# Patient Record
Sex: MALE | Race: OTHER | HISPANIC OR LATINO | Employment: PART TIME | URBAN - NONMETROPOLITAN AREA
[De-identification: names, ages, dates, MRNs, and addresses within clinical notes are randomized per-mention and may not be internally consistent; named-entity substitution may affect disease eponyms.]

---

## 2017-06-20 ENCOUNTER — NON-PROVIDER VISIT (OUTPATIENT)
Dept: URGENT CARE | Facility: PHYSICIAN GROUP | Age: 22
End: 2017-06-20

## 2017-06-20 DIAGNOSIS — Z02.1 PRE-EMPLOYMENT DRUG SCREENING: ICD-10-CM

## 2017-06-20 LAB
AMP AMPHETAMINE: NORMAL
COC COCAINE: NORMAL
INT CON NEG: NORMAL
INT CON POS: NORMAL
MET METHAMPHETAMINES: NORMAL
OPI OPIATES: NORMAL
PCP PHENCYCLIDINE: NORMAL
POC DRUG COMMENT 753798-OCCUPATIONAL HEALTH: NORMAL
THC: NORMAL

## 2017-06-20 PROCEDURE — 80305 DRUG TEST PRSMV DIR OPT OBS: CPT | Performed by: PHYSICIAN ASSISTANT

## 2017-07-16 ENCOUNTER — OFFICE VISIT (OUTPATIENT)
Dept: URGENT CARE | Facility: PHYSICIAN GROUP | Age: 22
End: 2017-07-16
Payer: MEDICAID

## 2017-07-16 VITALS
HEART RATE: 77 BPM | WEIGHT: 157 LBS | HEIGHT: 67 IN | TEMPERATURE: 97.5 F | DIASTOLIC BLOOD PRESSURE: 78 MMHG | SYSTOLIC BLOOD PRESSURE: 114 MMHG | OXYGEN SATURATION: 100 % | BODY MASS INDEX: 24.64 KG/M2

## 2017-07-16 DIAGNOSIS — R10.31 RLQ ABDOMINAL PAIN: ICD-10-CM

## 2017-07-16 PROCEDURE — 99214 OFFICE O/P EST MOD 30 MIN: CPT | Performed by: NURSE PRACTITIONER

## 2017-07-16 ASSESSMENT — ENCOUNTER SYMPTOMS
NAUSEA: 1
WEAKNESS: 1
CONSTIPATION: 0
CHILLS: 0
ANOREXIA: 1
FEVER: 1
DIARRHEA: 1
MYALGIAS: 1
VOMITING: 0
SORE THROAT: 0
COUGH: 0
BELCHING: 0
ABDOMINAL PAIN: 1

## 2017-07-16 ASSESSMENT — PATIENT HEALTH QUESTIONNAIRE - PHQ9: CLINICAL INTERPRETATION OF PHQ2 SCORE: 0

## 2017-07-17 NOTE — PROGRESS NOTES
"Subjective:      Logan Mason is a 21 y.o. male who presents with Headache            HPI Comments: Medications, Allergies and Prior Medical Hx reviewed and updated in [facility].with patient/family today         Abdominal Pain  This is a new problem. The current episode started in the past 7 days (3 days). The onset quality is gradual. The problem occurs constantly. The problem has been gradually worsening. The pain is located in the RLQ. The pain is at a severity of 5/10. The quality of the pain is sharp. Associated symptoms include anorexia, diarrhea (4 /day), a fever, myalgias and nausea. Pertinent negatives include no belching, constipation, dysuria, frequency, hematuria or vomiting. The pain is relieved by nothing. He has tried nothing for the symptoms. The treatment provided no relief. There is no history of abdominal surgery, gallstones or GERD.       Review of Systems   Constitutional: Positive for fever and malaise/fatigue. Negative for chills.   HENT: Negative for congestion and sore throat.    Respiratory: Negative for cough.    Cardiovascular: Negative for chest pain.   Gastrointestinal: Positive for nausea, abdominal pain, diarrhea (4 /day) and anorexia. Negative for vomiting and constipation.   Genitourinary: Negative for dysuria, frequency and hematuria.   Musculoskeletal: Positive for myalgias.   Neurological: Positive for weakness.          Objective:     /78 mmHg  Pulse 77  Temp(Src) 36.4 °C (97.5 °F)  Ht 1.702 m (5' 7\")  Wt 71.215 kg (157 lb)  BMI 24.58 kg/m2  SpO2 100%     Physical Exam   Constitutional: He appears well-developed and well-nourished.   HENT:   Head: Normocephalic.   Neck: Normal range of motion. Neck supple.   Cardiovascular: Normal rate, regular rhythm and normal heart sounds.    Pulmonary/Chest: Effort normal and breath sounds normal. No respiratory distress.   Abdominal: Soft. Bowel sounds are normal. There is tenderness in the right lower quadrant. There is tenderness " at McBurney's point. There is no rigidity, no rebound, no guarding, no CVA tenderness and negative Quiroz's sign.   Neurological: He is alert.   Awake, alert, answering questions appropriately, moving all extremeties   Skin: Skin is warm and dry.   Psychiatric: He has a normal mood and affect. His behavior is normal.   Vitals reviewed.              Assessment/Plan:       1. RLQ abdominal pain       I am concerned about possible appendicitis and do not have resources to evaluate,     D/w pt/family recommendation to transfer to ED for evaluation and treatment not available at this facility, they verbalize agreement and request [facility]  D/w Dr Lagos  who accepted patient   Pt will be transported via pvt vehicle

## 2020-12-14 ENCOUNTER — HOSPITAL ENCOUNTER (OUTPATIENT)
Facility: MEDICAL CENTER | Age: 25
End: 2020-12-14
Attending: PHYSICIAN ASSISTANT
Payer: MEDICAID

## 2020-12-14 ENCOUNTER — OFFICE VISIT (OUTPATIENT)
Dept: URGENT CARE | Facility: PHYSICIAN GROUP | Age: 25
End: 2020-12-14
Payer: MEDICAID

## 2020-12-14 VITALS
HEART RATE: 88 BPM | SYSTOLIC BLOOD PRESSURE: 126 MMHG | BODY MASS INDEX: 25.11 KG/M2 | WEIGHT: 160 LBS | TEMPERATURE: 97.6 F | DIASTOLIC BLOOD PRESSURE: 70 MMHG | OXYGEN SATURATION: 97 % | RESPIRATION RATE: 16 BRPM | HEIGHT: 67 IN

## 2020-12-14 DIAGNOSIS — J06.9 UPPER RESPIRATORY TRACT INFECTION, UNSPECIFIED TYPE: ICD-10-CM

## 2020-12-14 PROCEDURE — 99204 OFFICE O/P NEW MOD 45 MIN: CPT | Performed by: PHYSICIAN ASSISTANT

## 2020-12-14 PROCEDURE — U0003 INFECTIOUS AGENT DETECTION BY NUCLEIC ACID (DNA OR RNA); SEVERE ACUTE RESPIRATORY SYNDROME CORONAVIRUS 2 (SARS-COV-2) (CORONAVIRUS DISEASE [COVID-19]), AMPLIFIED PROBE TECHNIQUE, MAKING USE OF HIGH THROUGHPUT TECHNOLOGIES AS DESCRIBED BY CMS-2020-01-R: HCPCS

## 2020-12-14 NOTE — LETTER
December 14, 2020         Patient: Logan Mason   YOB: 1995   Date of Visit: 12/14/2020           To Whom it May Concern:    Logan Mason was seen in my clinic on 12/14/2020.    Please excuse patient for missing school/work until COVID results are available, typically taking 1-3 days.  They have been advised to quarantine during this time.      If you have any questions or concerns, please don't hesitate to call.        Sincerely,           Kari Max P.A.-C.  Electronically Signed

## 2020-12-15 DIAGNOSIS — J06.9 UPPER RESPIRATORY TRACT INFECTION, UNSPECIFIED TYPE: ICD-10-CM

## 2020-12-15 LAB — COVID ORDER STATUS COVID19: NORMAL

## 2020-12-15 NOTE — PROGRESS NOTES
Chief Complaint   Patient presents with   • Coronavirus Screening   • Runny Nose   • Pharyngitis   • Fatigue       HISTORY OF PRESENT ILLNESS: Patient is a 25 y.o. male who presents today for the following:    Cough x 2 days  Denies fever, bodyaches, chills, SOB  + HA, mild nasal congestion  OTC meds today: none  No known COVID exposure    Patient Active Problem List    Diagnosis Date Noted   • Back pain 10/14/2014   • Flu vaccine need 10/14/2014   • Soft tissue mass 10/14/2014       Allergies:Patient has no known allergies.    No current [employer]-ordered outpatient medications on file.     No current [employer]-ordered facility-administered medications on file.        History reviewed. No pertinent past medical history.    Social History     Tobacco Use   • Smoking status: Never Smoker   • Smokeless tobacco: Never Used   Substance Use Topics   • Alcohol use: Yes     Comment: rare ocasions   • Drug use: Yes     Frequency: 7.0 times per week     Types: Marijuana       No family status information on file.     Family History   Problem Relation Age of Onset   • Hypertension Mother        Review of Systems:    Constitutional ROS: No unexpected change in weight, No weakness, No fatigue  Eye ROS: No recent significant change in vision, No eye pain, redness, discharge  Ear ROS: No drainage, No tinnitus or vertigo, No recent change in hearing  Mouth/Throat ROS: No teeth or gum problems, No bleeding gums, No tongue complaints  Neck ROS: No swollen glands, No significant pain in neck  Pulmonary ROS: No chronic cough, sputum, or hemoptysis, No dyspnea on exertion, No wheezing  Cardiovascular ROS: No diaphoresis, No edema, No palpitations  Musculoskeletal/Extremities ROS: No peripheral edema, No pain, redness or swelling on the joints  Hematologic/Lymphatic ROS: No chills, No night sweats, No weight loss  Skin/Integumentary ROS: No edema, No evidence of rash, No itching      Exam:  /70   Pulse 88   Temp 36.4 °C (97.6 °F)   Resp  "16   Ht 1.702 m (5' 7\")   Wt 72.6 kg (160 lb)   SpO2 97%   General: Well developed, well nourished. No distress.    Eye: PERRL/EOMI; conjunctivae clear, lids normal.  ENMT: Lips without lesions, MMM. Oropharynx is clear. Bilateral TMs are within normal limits.  Pulmonary: Unlabored respiratory effort. Lungs clear to auscultation, no wheezes, no rhonchi.    Cardiovascular: Regular rate and rhythm without murmur.   Neurologic: Grossly nonfocal. No facial asymmetry noted.  Lymph: No cervical lymphadenopathy noted.  Skin: Warm, dry, good turgor. No rashes in visible areas.   Psych: Normal mood. Alert and oriented to person, place and time.    Assessment/Plan:  Discussed likely viral etiology.  Vitals and exam are unremarkable.  Low suspicion for pneumonia. Patient will be tested for COVID and has been advised to quarantine until results are available. Encouraged patient [facility]. Sign up information was sent via text message. Discussed appropriate over-the-counter symptomatic medication, and when to return to clinic. Follow up for worsening or persistent symptoms.  1. Upper respiratory tract infection, unspecified type  COVID/SARS COV-2 PCR       "

## 2020-12-16 LAB
SARS-COV-2 RNA RESP QL NAA+PROBE: DETECTED
SPECIMEN SOURCE: ABNORMAL

## 2021-08-12 ENCOUNTER — APPOINTMENT (OUTPATIENT)
Dept: RADIOLOGY | Facility: IMAGING CENTER | Age: 26
End: 2021-08-12
Attending: PHYSICIAN ASSISTANT
Payer: MEDICAID

## 2021-08-12 ENCOUNTER — HOSPITAL ENCOUNTER (OUTPATIENT)
Facility: MEDICAL CENTER | Age: 26
End: 2021-08-12
Attending: PHYSICIAN ASSISTANT
Payer: MEDICAID

## 2021-08-12 ENCOUNTER — OFFICE VISIT (OUTPATIENT)
Dept: URGENT CARE | Facility: CLINIC | Age: 26
End: 2021-08-12
Payer: MEDICAID

## 2021-08-12 VITALS
BODY MASS INDEX: 24.33 KG/M2 | TEMPERATURE: 97.9 F | DIASTOLIC BLOOD PRESSURE: 74 MMHG | WEIGHT: 155 LBS | RESPIRATION RATE: 16 BRPM | HEIGHT: 67 IN | OXYGEN SATURATION: 98 % | HEART RATE: 83 BPM | SYSTOLIC BLOOD PRESSURE: 126 MMHG

## 2021-08-12 DIAGNOSIS — R06.02 SOB (SHORTNESS OF BREATH): ICD-10-CM

## 2021-08-12 PROCEDURE — 71046 X-RAY EXAM CHEST 2 VIEWS: CPT | Mod: TC | Performed by: PHYSICIAN ASSISTANT

## 2021-08-12 PROCEDURE — 99214 OFFICE O/P EST MOD 30 MIN: CPT | Performed by: PHYSICIAN ASSISTANT

## 2021-08-12 PROCEDURE — U0003 INFECTIOUS AGENT DETECTION BY NUCLEIC ACID (DNA OR RNA); SEVERE ACUTE RESPIRATORY SYNDROME CORONAVIRUS 2 (SARS-COV-2) (CORONAVIRUS DISEASE [COVID-19]), AMPLIFIED PROBE TECHNIQUE, MAKING USE OF HIGH THROUGHPUT TECHNOLOGIES AS DESCRIBED BY CMS-2020-01-R: HCPCS

## 2021-08-12 PROCEDURE — U0005 INFEC AGEN DETEC AMPLI PROBE: HCPCS

## 2021-08-12 RX ORDER — ALBUTEROL SULFATE 90 UG/1
2 AEROSOL, METERED RESPIRATORY (INHALATION) EVERY 6 HOURS PRN
Qty: 8.5 G | Refills: 0 | Status: SHIPPED | OUTPATIENT
Start: 2021-08-12 | End: 2021-12-19

## 2021-08-12 ASSESSMENT — ENCOUNTER SYMPTOMS
ABDOMINAL PAIN: 0
VOMITING: 0
DIZZINESS: 0
FEVER: 0
DIARRHEA: 0
SHORTNESS OF BREATH: 1
SPUTUM PRODUCTION: 0
HEADACHES: 0
CHILLS: 0
NAUSEA: 0
WHEEZING: 0
RHINORRHEA: 1
SORE THROAT: 0
MYALGIAS: 0

## 2021-08-13 DIAGNOSIS — R06.02 SOB (SHORTNESS OF BREATH): ICD-10-CM

## 2021-08-13 LAB — COVID ORDER STATUS COVID19: NORMAL

## 2021-08-13 NOTE — PROGRESS NOTES
"Subjective     Logan Mason is a 25 y.o. male who presents with Shortness of Breath (congestion, chest tightness x 3 days )            Shortness of Breath  This is a new problem. The current episode started in the past 7 days. The problem occurs daily. The problem has been unchanged. Associated symptoms include rhinorrhea. Pertinent negatives include no abdominal pain, chest pain, fever, headaches, leg swelling, sore throat, sputum production, vomiting or wheezing. The symptoms are aggravated by smoke. He has tried nothing for the symptoms. The treatment provided no relief. There is no history of asthma or pneumonia.       PMH:  has no past medical history on file.  MEDS: No current outpatient medications on file.  ALLERGIES: No Known Allergies  SURGHX: No past surgical history on file.  SOCHX:  reports that he has never smoked. He has never used smokeless tobacco. He reports current alcohol use. He reports current drug use. Frequency: 7.00 times per week. Drug: Marijuana.  FH: family history includes Hypertension in his mother.    Review of Systems   Constitutional: Negative for chills and fever.   HENT: Positive for rhinorrhea. Negative for congestion and sore throat.    Respiratory: Positive for shortness of breath. Negative for sputum production and wheezing.    Cardiovascular: Negative for chest pain and leg swelling.   Gastrointestinal: Negative for abdominal pain, diarrhea, nausea and vomiting.   Musculoskeletal: Negative for myalgias.   Neurological: Negative for dizziness and headaches.       Medications, Allergies, and current problem list reviewed today in Epic           Objective     /74 (BP Location: Left arm, Patient Position: Sitting, BP Cuff Size: Adult)   Pulse 83   Temp 36.6 °C (97.9 °F) (Temporal)   Resp 16   Ht 1.702 m (5' 7\")   Wt 70.3 kg (155 lb)   SpO2 98%   BMI 24.28 kg/m²      Physical Exam  Vitals and nursing note reviewed.   Constitutional:       General: He is not in acute " distress.     Appearance: Normal appearance. He is well-developed. He is not ill-appearing, toxic-appearing or diaphoretic.   HENT:      Head: Normocephalic and atraumatic.      Right Ear: Tympanic membrane, ear canal and external ear normal.      Left Ear: Tympanic membrane, ear canal and external ear normal.      Nose: Nose normal. No congestion or rhinorrhea.      Mouth/Throat:      Mouth: Mucous membranes are moist.      Pharynx: Oropharynx is clear. No oropharyngeal exudate or posterior oropharyngeal erythema.   Eyes:      General:         Right eye: No discharge.         Left eye: No discharge.      Conjunctiva/sclera: Conjunctivae normal.   Cardiovascular:      Rate and Rhythm: Normal rate and regular rhythm.      Pulses: Normal pulses.      Heart sounds: Normal heart sounds. No murmur heard.     Pulmonary:      Effort: Pulmonary effort is normal. No respiratory distress.      Breath sounds: Normal breath sounds. No wheezing, rhonchi or rales.   Chest:      Chest wall: No tenderness.   Musculoskeletal:         General: No swelling or tenderness.      Cervical back: Normal range of motion and neck supple.      Right lower leg: No edema.      Left lower leg: No edema.   Lymphadenopathy:      Cervical: No cervical adenopathy.   Skin:     General: Skin is warm and dry.   Neurological:      Mental Status: He is alert and oriented to person, place, and time.   Psychiatric:         Mood and Affect: Mood normal.         Behavior: Behavior normal.         Thought Content: Thought content normal.         Judgment: Judgment normal.                   Assessment & Plan          1. SOB (shortness of breath)  DX-CHEST-2 VIEWS    SARS-CoV-2, PCR (In-House): Collect NP OR nasal swab in VTM    albuterol 108 (90 Base) MCG/ACT Aero Soln inhalation aerosol     Shortness of breath with congestion for 3 days.  Tight chest and heavy breathing.  No vomiting, diarrhea, fever, chest pain.  Patient does not feel sick and was not exposed  to Covid.  His PO2 is 90% his vital signs are normal.  His lungs are clear bilaterally without wheezes rhonchi or rales.  Chest x-ray normal.  Covid testing negative.  Likely reactive airway to environmental smoke and air quality.  I believe there is a slight anxiety component as well.  Albuterol.  Follow-up as needed.  OTC meds and conservative measures as discussed    Return to clinic or go to ED if symptoms worsen or persist. Indications for ED discussed at length. Patient/Parent/Guardian voices understanding. Follow-up with your primary care provider in 3-5 days. Red flag symptoms discussed. All side effects of medication discussed including allergic response, GI upset, tendon injury, rash, sedation etc.    Please note that this dictation was created using voice recognition software. I have made every reasonable attempt to correct obvious errors, but I expect that there are errors of grammar and possibly content that I did not discover before finalizing the note.

## 2021-08-14 LAB
SARS-COV-2 RNA RESP QL NAA+PROBE: NOTDETECTED
SPECIMEN SOURCE: NORMAL

## 2021-10-09 ENCOUNTER — OFFICE VISIT (OUTPATIENT)
Dept: URGENT CARE | Facility: CLINIC | Age: 26
End: 2021-10-09
Payer: MEDICAID

## 2021-10-09 ENCOUNTER — HOSPITAL ENCOUNTER (OUTPATIENT)
Facility: MEDICAL CENTER | Age: 26
End: 2021-10-09
Attending: NURSE PRACTITIONER
Payer: MEDICAID

## 2021-10-09 VITALS
SYSTOLIC BLOOD PRESSURE: 126 MMHG | HEART RATE: 98 BPM | OXYGEN SATURATION: 97 % | DIASTOLIC BLOOD PRESSURE: 68 MMHG | HEIGHT: 68 IN | BODY MASS INDEX: 24.4 KG/M2 | TEMPERATURE: 97.9 F | WEIGHT: 161 LBS | RESPIRATION RATE: 20 BRPM

## 2021-10-09 DIAGNOSIS — N48.89 PENILE IRRITATION: ICD-10-CM

## 2021-10-09 PROCEDURE — 99213 OFFICE O/P EST LOW 20 MIN: CPT | Performed by: NURSE PRACTITIONER

## 2021-10-09 PROCEDURE — 87529 HSV DNA AMP PROBE: CPT

## 2021-10-10 NOTE — PROGRESS NOTES
Chief Complaint   Patient presents with   • Other     cut on penis x 1 week       HISTORY OF PRESENT ILLNESS: Patient is a pleasant 25 y.o. male who presents to urgent care today with complaints of irritation to tip of his penis over the past week. He has noticed two lesions. Reports associated dry skin. Denies dysuria, fever, discharge, body aches. He is sexually active with his wife, she is without symptoms. He has tried neosporin with some improvement but has returned.  Denies history of STIs.  He does not have a PCP follow-up.    Patient Active Problem List    Diagnosis Date Noted   • Back pain 10/14/2014   • Flu vaccine need 10/14/2014   • Soft tissue mass 10/14/2014       Allergies:Patient has no known allergies.    Current Outpatient Medications Ordered in Epic   Medication Sig Dispense Refill   • albuterol 108 (90 Base) MCG/ACT Aero Soln inhalation aerosol Inhale 2 Puffs every 6 hours as needed for Shortness of Breath. 8.5 g 0     No current Epic-ordered facility-administered medications on file.       No past medical history on file.    Social History     Tobacco Use   • Smoking status: Never Smoker   • Smokeless tobacco: Never Used   Vaping Use   • Vaping Use: Never used   Substance Use Topics   • Alcohol use: Yes     Comment: rare ocasions   • Drug use: Yes     Frequency: 7.0 times per week     Types: Marijuana       Family Status   Relation Name Status   • Mo  (Not Specified)     Family History   Problem Relation Age of Onset   • Hypertension Mother        ROS:  Review of Systems   Constitutional: Negative for fever, chills, weight loss, malaise, and fatigue.   HENT: Negative for ear pain, nosebleeds, congestion, sore throat and neck pain.    Eyes: Negative for vision changes.   Neuro: Negative for headache, sensory changes, weakness, seizure, LOC.   Cardiovascular: Negative for chest pain, palpitations, orthopnea and leg swelling.   Respiratory: Negative for cough, sputum production, shortness of breath  "and wheezing.   Gastrointestinal: Negative for abdominal pain, nausea, vomiting or diarrhea.   Genitourinary: Negative for dysuria, urgency and frequency.  Musculoskeletal: Negative for falls, neck pain, back pain, joint pain, myalgias.   Skin: Positive for penile lesion.  Negative for rash, diaphoresis.     Exam:  /68 (BP Location: Left arm, Patient Position: Sitting, BP Cuff Size: Adult long)   Pulse 98   Temp 36.6 °C (97.9 °F) (Temporal)   Resp 20   Ht 1.727 m (5' 8\")   Wt 73 kg (161 lb)   SpO2 97%   General: well-nourished, well-developed male in NAD  Head: normocephalic, atraumatic  Eyes: PERRLA, no conjunctival injection, acuity grossly intact, lids normal.  Ears: normal shape and symmetry, no tenderness, no discharge. External canals are without any significant edema or erythema. Tympanic membranes are without any inflammation, no effusion. Gross auditory acuity is intact.  Nose: symmetrical without tenderness, no discharge.  Mouth/Throat: reasonable hygiene, no erythema, exudates or tonsillar enlargement.  Neck: no masses, range of motion within normal limits, no tracheal deviation. No obvious thyroid enlargement.   Lymph: no cervical adenopathy. No supraclavicular adenopathy.   Neuro: alert and oriented. Cranial nerves 1-12 grossly intact. No sensory deficit.   Cardiovascular: regular rate and rhythm. No edema.  Pulmonary: no distress. Chest is symmetrical with respiration, no wheezes, crackles, or rhonchi.   : Uncircumcised penis, at shaft there is a 3 mm fissured lesion, no active drainage, minimal surrounding dry skin.  Testicles normal in appearance.  Musculoskeletal: no clubbing, appropriate muscle tone, gait is stable.  Skin: warm, dry, intact, no clubbing, no cyanosis, no rashes.   Psych: appropriate mood, affect, judgement.         Assessment/Plan:  1. Penile irritation  mupirocin (BACTROBAN) 2 % Ointment    HERPES VIRAL CULTURE           Patient presents with penile irritation and " small lesion noted.  He reports he improved with Neosporin but then worsened again.  Therefore will treat with Bactroban and perform viral culture.  Supportive care, differential diagnoses, and indications for immediate follow-up discussed with patient.   Pathogenesis of diagnosis discussed including typical length and natural progression.   Instructed to return to clinic or nearest emergency department for any change in condition, further concerns, or worsening of symptoms.  Patient states understanding of the plan of care and discharge instructions.  Instructed to make an appointment, for follow up, with a primary care provider.        Please note that this dictation was created using voice recognition software. I have made every reasonable attempt to correct obvious errors, but I expect that there are errors of grammar and possibly content that I did not discover before finalizing the note.      MARIA LUISA Rothman.

## 2021-10-12 LAB
HSV1 DNA SPEC QL NAA+PROBE: NEGATIVE
HSV2 DNA SPEC QL NAA+PROBE: NEGATIVE
SPECIMEN SOURCE: NORMAL

## 2021-11-06 ENCOUNTER — OFFICE VISIT (OUTPATIENT)
Dept: URGENT CARE | Facility: CLINIC | Age: 26
End: 2021-11-06
Payer: MEDICAID

## 2021-11-06 VITALS
DIASTOLIC BLOOD PRESSURE: 88 MMHG | SYSTOLIC BLOOD PRESSURE: 134 MMHG | HEART RATE: 88 BPM | RESPIRATION RATE: 16 BRPM | WEIGHT: 156.2 LBS | TEMPERATURE: 97.9 F | OXYGEN SATURATION: 96 % | HEIGHT: 67 IN | BODY MASS INDEX: 24.52 KG/M2

## 2021-11-06 DIAGNOSIS — L30.9 DERMATITIS: ICD-10-CM

## 2021-11-06 PROCEDURE — 99213 OFFICE O/P EST LOW 20 MIN: CPT | Performed by: NURSE PRACTITIONER

## 2021-11-06 RX ORDER — KETOCONAZOLE 20 MG/G
1 CREAM TOPICAL 2 TIMES DAILY
Qty: 60 G | Refills: 0 | Status: SHIPPED | OUTPATIENT
Start: 2021-11-06 | End: 2021-11-20

## 2021-11-07 NOTE — PROGRESS NOTES
Subjective     Logan Mason is a 25 y.o. male who presents with Tinea (possible ringworm)    No past medical history on file.  Social History     Socioeconomic History   • Marital status:      Spouse name: Not on file   • Number of children: Not on file   • Years of education: Not on file   • Highest education level: Not on file   Occupational History   • Not on file   Tobacco Use   • Smoking status: Never Smoker   • Smokeless tobacco: Never Used   Vaping Use   • Vaping Use: Never used   Substance and Sexual Activity   • Alcohol use: Yes     Comment: rare occassions   • Drug use: Yes     Frequency: 7.0 times per week     Types: Marijuana, Inhaled   • Sexual activity: Not on file   Other Topics Concern   • Not on file   Social History Narrative   • Not on file     Social Determinants of Health     Financial Resource Strain:    • Difficulty of Paying Living Expenses:    Food Insecurity:    • Worried About Running Out of Food in the Last Year:    • Ran Out of Food in the Last Year:    Transportation Needs:    • Lack of Transportation (Medical):    • Lack of Transportation (Non-Medical):    Physical Activity:    • Days of Exercise per Week:    • Minutes of Exercise per Session:    Stress:    • Feeling of Stress :    Social Connections:    • Frequency of Communication with Friends and Family:    • Frequency of Social Gatherings with Friends and Family:    • Attends Jainism Services:    • Active Member of Clubs or Organizations:    • Attends Club or Organization Meetings:    • Marital Status:    Intimate Partner Violence:    • Fear of Current or Ex-Partner:    • Emotionally Abused:    • Physically Abused:    • Sexually Abused:      Family History   Problem Relation Age of Onset   • Hypertension Mother        Allergies: Patient has no known allergies.    Patient is a 25-year-old male who presents today with complaint of possible tinea infection.  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  Patient states he is noted a few areas on his arm and trunk over the last week or so.  He states no fever, aches, or chills.  No severe itching.          Other  This is a new problem. The current episode started in the past 7 days. The problem occurs constantly. The problem has been unchanged. Associated symptoms include a rash. Nothing aggravates the symptoms. He has tried nothing for the symptoms. The treatment provided no relief.       Review of Systems   Skin: Positive for rash.   All other systems reviewed and are negative.             Objective     /88   Pulse 88   Temp 36.6 °C (97.9 °F)   Resp 16   Ht 1.702 m (5' 7\")   Wt 70.9 kg (156 lb 3.2 oz)   SpO2 96%   BMI 24.46 kg/m²      Physical Exam  Vitals reviewed.   Constitutional:       Appearance: Normal appearance.   Musculoskeletal:         General: Normal range of motion.   Skin:     General: Skin is warm and dry.      Findings: Rash present.   Neurological:      General: No focal deficit present.      Mental Status: He is alert and oriented to person, place, and time.   Psychiatric:         Mood and Affect: Mood normal.         Behavior: Behavior normal.       There is an annular lesion noted to the left wrist, left cheek, and upper trunk.                      Assessment & Plan   Dermatitis; possible tinea infection    Ketoconazole topical  Return to office for persistent or worsening of symptoms, may return otherwise for any further questions or concerns     There are no diagnoses linked to this encounter.              "

## 2021-12-19 ENCOUNTER — OFFICE VISIT (OUTPATIENT)
Dept: URGENT CARE | Facility: CLINIC | Age: 26
End: 2021-12-19
Payer: MEDICAID

## 2021-12-19 VITALS
BODY MASS INDEX: 24.74 KG/M2 | OXYGEN SATURATION: 95 % | WEIGHT: 157.6 LBS | RESPIRATION RATE: 16 BRPM | DIASTOLIC BLOOD PRESSURE: 90 MMHG | TEMPERATURE: 98 F | HEIGHT: 67 IN | SYSTOLIC BLOOD PRESSURE: 140 MMHG | HEART RATE: 102 BPM

## 2021-12-19 DIAGNOSIS — N48.89 PENILE IRRITATION: ICD-10-CM

## 2021-12-19 PROCEDURE — 99213 OFFICE O/P EST LOW 20 MIN: CPT | Performed by: PHYSICIAN ASSISTANT

## 2021-12-20 NOTE — PROGRESS NOTES
"Subjective:   Logan Mason is a 26 y.o. male who presents for Medication Refill (Request refill on ointment for dry skin on penis area. )      HPI  26 y.o. male presents to urgent care with new problem to provider of irritation with and dry skin to the tip of penis noted over the last week or so.  Patient denies dysuria, discharge, fevers, body aches, or abdominal pain.  He is sexually active in a monogamous relationship with his wife.  He denies exposure concern for STDs.  Patient reports history of similar success treated with antibiotic ointment. Denies other associated aggravating or alleviating factors.     Review of Systems   Constitutional: Negative for chills and fever.   Gastrointestinal: Negative for abdominal pain, diarrhea, nausea and vomiting.   Genitourinary: Negative for dysuria, flank pain, frequency, hematuria and urgency.        Penile discharge   All other systems reviewed and are negative.      Patient Active Problem List   Diagnosis   • Back pain   • Flu vaccine need   • Soft tissue mass     History reviewed. No pertinent surgical history.  Social History     Tobacco Use   • Smoking status: Never Smoker   • Smokeless tobacco: Never Used   Vaping Use   • Vaping Use: Never used   Substance Use Topics   • Alcohol use: Yes     Comment: rare occassions   • Drug use: Yes     Frequency: 7.0 times per week     Types: Marijuana, Inhaled      Family History   Problem Relation Age of Onset   • Hypertension Mother       (Allergies, Medications, & Tobacco/Substance Use were reconciled by the Medical Assistant and reviewed by myself. The family history is prepopulated)     Objective:     /90   Pulse (!) 102   Temp 36.7 °C (98 °F)   Resp 16   Ht 1.702 m (5' 7\")   Wt 71.5 kg (157 lb 9.6 oz)   SpO2 95%   BMI 24.68 kg/m²     Physical Exam  Vitals reviewed.   Constitutional:       General: He is not in acute distress.     Appearance: Normal appearance. He is not ill-appearing.   HENT:      Head: " Normocephalic and atraumatic.   Cardiovascular:      Rate and Rhythm: Normal rate.   Pulmonary:      Effort: Pulmonary effort is normal. No respiratory distress.   Genitourinary:     Comments: Patient defers  exam  Musculoskeletal:         General: Normal range of motion.      Cervical back: Normal range of motion.   Skin:     General: Skin is warm and dry.   Neurological:      Mental Status: He is alert and oriented to person, place, and time.   Psychiatric:         Mood and Affect: Mood normal.         Behavior: Behavior normal.         Thought Content: Thought content normal.         Judgment: Judgment normal.         Assessment/Plan:     1. Penile irritation  mupirocin (BACTROBAN) 2 % Ointment     Patient reports history of similar penile irritation treated successfully with Bactroban.  Differential diagnosis includes balanitis, patient is uncircumcised.  Recommend previous prescribed antifungal ketoconazole if symptoms persist/worsen after finishing topical antibiotic course for 1 week.    Differential diagnosis, natural history, supportive care, and indications for immediate follow-up discussed.    Advised the patient to follow-up with the primary care physician for recheck, reevaluation, and consideration of further management.  Patient verbalized understanding of treatment plan and has no further questions regarding care.     I personally reviewed prior external notes and test results pertinent to today's visit.    Please note that this dictation was created using voice recognition software. I have made a reasonable attempt to correct obvious errors, but I expect that there are errors of grammar and possibly content that I did not discover before finalizing the note.    This note was electronically signed by Zeinab Lucia PA-C

## 2021-12-23 ASSESSMENT — ENCOUNTER SYMPTOMS
CHILLS: 0
ABDOMINAL PAIN: 0
NAUSEA: 0
FEVER: 0
FLANK PAIN: 0
DIARRHEA: 0
VOMITING: 0

## 2021-12-27 ENCOUNTER — HOSPITAL ENCOUNTER (OUTPATIENT)
Facility: MEDICAL CENTER | Age: 26
End: 2021-12-27
Attending: PHYSICIAN ASSISTANT
Payer: MEDICAID

## 2021-12-27 ENCOUNTER — OFFICE VISIT (OUTPATIENT)
Dept: URGENT CARE | Facility: CLINIC | Age: 26
End: 2021-12-27
Payer: MEDICAID

## 2021-12-27 VITALS
TEMPERATURE: 97.6 F | DIASTOLIC BLOOD PRESSURE: 70 MMHG | HEIGHT: 67 IN | BODY MASS INDEX: 24.8 KG/M2 | SYSTOLIC BLOOD PRESSURE: 120 MMHG | RESPIRATION RATE: 16 BRPM | WEIGHT: 158 LBS | OXYGEN SATURATION: 98 % | HEART RATE: 104 BPM

## 2021-12-27 DIAGNOSIS — J02.9 PHARYNGITIS, UNSPECIFIED ETIOLOGY: ICD-10-CM

## 2021-12-27 DIAGNOSIS — B34.9 NONSPECIFIC SYNDROME SUGGESTIVE OF VIRAL ILLNESS: ICD-10-CM

## 2021-12-27 DIAGNOSIS — H10.32 ACUTE BACTERIAL CONJUNCTIVITIS OF LEFT EYE: ICD-10-CM

## 2021-12-27 LAB
INT CON NEG: NEGATIVE
INT CON POS: POSITIVE
S PYO AG THROAT QL: NEGATIVE

## 2021-12-27 PROCEDURE — U0003 INFECTIOUS AGENT DETECTION BY NUCLEIC ACID (DNA OR RNA); SEVERE ACUTE RESPIRATORY SYNDROME CORONAVIRUS 2 (SARS-COV-2) (CORONAVIRUS DISEASE [COVID-19]), AMPLIFIED PROBE TECHNIQUE, MAKING USE OF HIGH THROUGHPUT TECHNOLOGIES AS DESCRIBED BY CMS-2020-01-R: HCPCS

## 2021-12-27 PROCEDURE — U0005 INFEC AGEN DETEC AMPLI PROBE: HCPCS

## 2021-12-27 PROCEDURE — 87880 STREP A ASSAY W/OPTIC: CPT | Mod: QW | Performed by: PHYSICIAN ASSISTANT

## 2021-12-27 PROCEDURE — 99213 OFFICE O/P EST LOW 20 MIN: CPT | Performed by: PHYSICIAN ASSISTANT

## 2021-12-27 RX ORDER — POLYMYXIN B SULFATE AND TRIMETHOPRIM 1; 10000 MG/ML; [USP'U]/ML
1 SOLUTION OPHTHALMIC EVERY 4 HOURS
Qty: 4 ML | Refills: 0 | Status: SHIPPED | OUTPATIENT
Start: 2021-12-27 | End: 2022-01-06

## 2021-12-27 ASSESSMENT — ENCOUNTER SYMPTOMS
COUGH: 1
EYE REDNESS: 1
DOUBLE VISION: 0
EYE DISCHARGE: 0
SORE THROAT: 1
PHOTOPHOBIA: 0
BLURRED VISION: 0

## 2021-12-27 NOTE — LETTER
December 27, 2021    To Whom It May Concern:         This is confirmation that Logan Mason attended his scheduled appointment with Adriano Hlul P.A.-C. on 12/27/21.  Your employee was seen in our clinic today.  A concern for COVID-19 has been identified and testing is in progress. We are asking you to excuse absences while following self-isolation protocol per Center for Disease Control (CDC) guidelines.  Your employee will be able to access test results through our electronic delivery system called [employer].     If the results of testing are positive, your employee should follow the CDC guidelines.  These are isolation for a minimum of 10 days and at least 24 hours have passed since your last fever without the use of fever-reducing medications and all other symptoms have improved.  The health department may contact you and provide further directions regarding self-isolation and return to work.     If the results of testing are negative, and once there has been no fever (tem >100.4) for at least 48 hours without treatment, and no vomiting or diarrhea for at least 48 hours, then return to work is approved.    This is the only note that will be provided from [facility] for this visit.  Your employee will require an appointment with a primary care provider if FMLA or disability forms are required.  If you have any questions please do not hesitate to call me at the phone number listed below.    Sincerely,    Adriano Hull P.A.-C.  [phone]  (signed electronically)

## 2021-12-28 DIAGNOSIS — H10.32 ACUTE BACTERIAL CONJUNCTIVITIS OF LEFT EYE: ICD-10-CM

## 2021-12-28 DIAGNOSIS — J02.9 PHARYNGITIS, UNSPECIFIED ETIOLOGY: ICD-10-CM

## 2021-12-28 DIAGNOSIS — B34.9 NONSPECIFIC SYNDROME SUGGESTIVE OF VIRAL ILLNESS: ICD-10-CM

## 2021-12-28 LAB — COVID ORDER STATUS COVID19: NORMAL

## 2021-12-28 NOTE — PROGRESS NOTES
"Subjective:   Logan Mason is a 26 y.o. male who presents for Pharyngitis (sore throat, congestion, possible strep, Wife came in yesterday and tested positive.) and Conjunctivitis (Possible pink eye, noticed the redness, but said it doesn't itch and no discharge.)      Pleasant 26-year-old male presents complaining of nasal congestion, mild sore throat, occasional dry cough as well as left eye irritation without discharge.  He notes that his wife was seen yesterday and has a pending Covid test however point-of-care test for strep was positive.  He denies any difficulty swallowing.  He notes that without any over-the-counter medication his throat is feeling better today.  He denies any difficulty breathing.      Review of Systems   HENT: Positive for congestion and sore throat.    Eyes: Positive for redness. Negative for blurred vision, double vision, photophobia and discharge.   Respiratory: Positive for cough.        Medications, Allergies, and current problem list reviewed today in Epic.     Objective:     /70 (BP Location: Left arm, Patient Position: Sitting, BP Cuff Size: Adult)   Pulse (!) 104   Temp 36.4 °C (97.6 °F) (Temporal)   Resp 16   Ht 1.702 m (5' 7\")   Wt 71.7 kg (158 lb)   SpO2 98%     Physical Exam  Vitals reviewed.   Constitutional:       Appearance: Normal appearance.   HENT:      Head: Normocephalic and atraumatic.      Right Ear: External ear normal.      Left Ear: External ear normal.      Nose: Rhinorrhea present. No congestion.      Mouth/Throat:      Mouth: Mucous membranes are moist.      Pharynx: Oropharynx is clear. No oropharyngeal exudate or posterior oropharyngeal erythema.   Eyes:      Conjunctiva/sclera: Conjunctivae normal.      Comments: Mild left eye conjunctival injection without erythema surrounding or discharge.  PERRLA, EOMI.   Cardiovascular:      Rate and Rhythm: Normal rate and regular rhythm.   Pulmonary:      Effort: Pulmonary effort is normal.      Breath " sounds: Normal breath sounds.   Lymphadenopathy:      Cervical: No cervical adenopathy.   Skin:     General: Skin is warm and dry.      Capillary Refill: Capillary refill takes less than 2 seconds.   Neurological:      Mental Status: He is alert and oriented to person, place, and time.         Assessment/Plan:     Diagnosis and associated orders:     1. Acute bacterial conjunctivitis of left eye  COVID/SARS CoV-2 PCR    polymixin-trimethoprim (POLYTRIM) 68449-7.1 UNIT/ML-% Solution   2. Pharyngitis, unspecified etiology  COVID/SARS CoV-2 PCR    POCT Rapid Strep A   3. Nonspecific syndrome suggestive of viral illness  COVID/SARS CoV-2 PCR      Comments/MDM:     • Patient does not wear contact lenses and his vision is not affected, recommend wait-and-see and if notes discharge or worsening redness and irritation of the left arm initiate Polytrim drops.  Point-of-care Covid testing is negative.  Recommend Covid testing, isolate till results available via [employer].  Then follow CDC guidelines thereafter.         Differential diagnosis, natural history, supportive care, and indications for immediate follow-up discussed.    Advised the patient to follow-up with the primary care physician for recheck, reevaluation, and consideration of further management.    Please note that this dictation was created using voice recognition software. I have made a reasonable attempt to correct obvious errors, but I expect that there are errors of grammar and possibly content that I did not discover before finalizing the note.    This note was electronically signed by Adriano Hull PA-C

## 2021-12-29 LAB
SARS-COV-2 RNA RESP QL NAA+PROBE: NOTDETECTED
SPECIMEN SOURCE: NORMAL

## 2022-01-12 ENCOUNTER — TELEPHONE (OUTPATIENT)
Dept: SCHEDULING | Facility: IMAGING CENTER | Age: 27
End: 2022-01-12

## 2022-01-21 ENCOUNTER — OFFICE VISIT (OUTPATIENT)
Dept: MEDICAL GROUP | Facility: MEDICAL CENTER | Age: 27
End: 2022-01-21
Payer: COMMERCIAL

## 2022-01-21 VITALS
BODY MASS INDEX: 24.55 KG/M2 | HEIGHT: 67 IN | DIASTOLIC BLOOD PRESSURE: 78 MMHG | WEIGHT: 156.4 LBS | TEMPERATURE: 97.6 F | OXYGEN SATURATION: 94 % | SYSTOLIC BLOOD PRESSURE: 114 MMHG | HEART RATE: 82 BPM

## 2022-01-21 DIAGNOSIS — B35.6 TINEA CRURIS: ICD-10-CM

## 2022-01-21 DIAGNOSIS — Z00.00 WELLNESS EXAMINATION: ICD-10-CM

## 2022-01-21 PROCEDURE — 99214 OFFICE O/P EST MOD 30 MIN: CPT | Performed by: STUDENT IN AN ORGANIZED HEALTH CARE EDUCATION/TRAINING PROGRAM

## 2022-01-21 RX ORDER — KETOCONAZOLE 20 MG/ML
SHAMPOO TOPICAL
Qty: 100 ML | Refills: 2 | Status: SHIPPED | OUTPATIENT
Start: 2022-01-21 | End: 2022-03-08 | Stop reason: SDUPTHER

## 2022-01-21 ASSESSMENT — PATIENT HEALTH QUESTIONNAIRE - PHQ9: CLINICAL INTERPRETATION OF PHQ2 SCORE: 0

## 2022-01-21 NOTE — PROGRESS NOTES
"Subjective:     CC:  Diagnoses of Tinea cruris and Wellness examination were pertinent to this visit.    HISTORY OF THE PRESENT ILLNESS: Logan Mason is a 26-year-old man here to establish care.    Problem   Tinea Cruris    For past few months she has had recurrent rash on his groin and buttocks area.  Was treated with mupirocin after being seen in urgent care however did not improve with this, then was told it was a fungal infection.  He used an over-the-counter antifungal and it improved and is currently not having the issue however he is concerned about another recurrence.  Of note he has also been having issues with ringworm in his household, and his wife is using clotrimazole cream, and they have begun to start treating their pets.         Current Outpatient Medications Ordered in Epic   Medication Sig Dispense Refill   • ketoconazole (NIZORAL) 2 % shampoo Apply to affected area of damp skin, lather, leave on 5 minutes, and rinse 100 mL 2   • mupirocin (BACTROBAN) 2 % Ointment Apply 1 Application topically 2 times a day. (Patient not taking: Reported on 1/21/2022) 22 g 0     No current Epic-ordered facility-administered medications on file.       Health Maintenance: Completed    ROS:   Gen: no fevers/chills, no changes in weight  Eyes: no changes in vision  ENT: no sore throat, no hearing loss, no bloody nose  Pulm: no sob, no cough  CV: no chest pain, no palpitations  GI: no nausea/vomiting, no diarrhea  : no dysuria  MSk: no myalgias  Skin: no rash  Neuro: no headaches, no numbness/tingling  Heme/Lymph: no easy bruising      Objective:       Exam: /78   Pulse 82   Temp 36.4 °C (97.6 °F) (Temporal)   Ht 1.702 m (5' 7\")   Wt 70.9 kg (156 lb 6.4 oz)   SpO2 94%  Body mass index is 24.5 kg/m².    General: Normal appearing. No distress.  HEENT: Normocephalic. Eyes conjunctiva clear lids without ptosis, pupils equal and reactive to light accommodation, ears normal shape and contour  Neck: Supple " without JVD or bruit. Thyroid is not enlarged.  Pulmonary: Clear to ausculation.  Normal effort. No rales, ronchi, or wheezing.  Cardiovascular: Regular rate and rhythm without murmur. Carotid and radial pulses are intact and equal bilaterally.  Neurologic: Grossly nonfocal  Lymph: No cervical or supraclavicular lymph nodes are palpable  Skin: Warm and dry.  No obvious lesions.  Musculoskeletal: Normal gait. No extremity cyanosis, clubbing, or edema.  Psych: Normal mood and affect. Alert and oriented x3. Judgment and insight is normal.        Assessment & Plan:   26 y.o. male with the following -    Problem List Items Addressed This Visit     Tinea cruris     Ketoconazole shampoo as needed for recurrence  Advised patient that all members of household should have full treatment for ringworm to prevent recurrence of rashes.         Relevant Medications    ketoconazole (NIZORAL) 2 % shampoo    Other Relevant Orders    Comp Metabolic Panel      Other Visit Diagnoses     Wellness examination        Relevant Orders    Comp Metabolic Panel        Return if symptoms worsen or fail to improve.    Please note that this dictation was created using voice recognition software. I have made every reasonable attempt to correct obvious errors, but I expect that there are errors of grammar and possibly content that I did not discover before finalizing the note.

## 2022-02-22 ENCOUNTER — HOSPITAL ENCOUNTER (OUTPATIENT)
Facility: MEDICAL CENTER | Age: 27
End: 2022-02-22
Attending: STUDENT IN AN ORGANIZED HEALTH CARE EDUCATION/TRAINING PROGRAM
Payer: COMMERCIAL

## 2022-02-22 ENCOUNTER — OFFICE VISIT (OUTPATIENT)
Dept: MEDICAL GROUP | Facility: MEDICAL CENTER | Age: 27
End: 2022-02-22
Payer: COMMERCIAL

## 2022-02-22 VITALS
DIASTOLIC BLOOD PRESSURE: 82 MMHG | WEIGHT: 151.8 LBS | TEMPERATURE: 97.8 F | OXYGEN SATURATION: 98 % | BODY MASS INDEX: 23.83 KG/M2 | HEART RATE: 71 BPM | SYSTOLIC BLOOD PRESSURE: 118 MMHG | HEIGHT: 67 IN

## 2022-02-22 DIAGNOSIS — R05.9 COUGH: ICD-10-CM

## 2022-02-22 PROCEDURE — U0005 INFEC AGEN DETEC AMPLI PROBE: HCPCS

## 2022-02-22 PROCEDURE — U0003 INFECTIOUS AGENT DETECTION BY NUCLEIC ACID (DNA OR RNA); SEVERE ACUTE RESPIRATORY SYNDROME CORONAVIRUS 2 (SARS-COV-2) (CORONAVIRUS DISEASE [COVID-19]), AMPLIFIED PROBE TECHNIQUE, MAKING USE OF HIGH THROUGHPUT TECHNOLOGIES AS DESCRIBED BY CMS-2020-01-R: HCPCS

## 2022-02-22 PROCEDURE — 99213 OFFICE O/P EST LOW 20 MIN: CPT | Performed by: STUDENT IN AN ORGANIZED HEALTH CARE EDUCATION/TRAINING PROGRAM

## 2022-02-22 RX ORDER — BENZONATATE 100 MG/1
100 CAPSULE ORAL 3 TIMES DAILY PRN
Qty: 60 CAPSULE | Refills: 0 | Status: SHIPPED | OUTPATIENT
Start: 2022-02-22 | End: 2022-03-04

## 2022-02-22 NOTE — LETTER
[facility] [address] [facility] [address]     February 22, 2022    Patient: Logan Mason   YOB: 1995   Date of Visit: 2/22/2022       To Whom It May Concern:    Logan Mason was seen and treated in our department on 2/22/2022. Please excuse any recent absences due to illness.     Sincerely,       Pablo Hector M.D.

## 2022-02-23 DIAGNOSIS — R05.9 COUGH: ICD-10-CM

## 2022-02-23 LAB
AMBIGUOUS DTTM AMBI4: NORMAL
COVID ORDER STATUS COVID19: NORMAL
SARS-COV-2 RNA RESP QL NAA+PROBE: NOTDETECTED
SPECIMEN SOURCE: NORMAL

## 2022-02-23 NOTE — PATIENT INSTRUCTIONS
Try cetirizine for congestion  Continue taking guaifenisin  Try tessalon perles    Saline Rinse Recipe  Ingredients  1.  Pickling or fran salt-containing no iodide, anti-caking agents or preservatives (these can be irritating to the nasal lining)  2.  Baking soda  3.  8 ounces (1 cup) of lukewarm distilled or boiled water    In a clean container, mix 3 teaspoons of iodide-free salt with 1 teaspoon of baking soda and store in a small airtight container. Add 1 teaspoon of the mixture to 8 ounces (1 cup) of lukewarm distilled or boiled water.    Use less dry ingredients to make a weaker solution if burning or stinging is experienced. For children, use a half-teaspoon with 4 ounces of water.    Using a soft rubber ear bulb syringe, infant nasal bulb or a commercial nasal saline rinse product from your drug store, use the rinse by following these steps:    1.  Draw up saline into the bulb. Tilt your head downward over a sink (or in the shower) and rotate to the left. Squeeze approximately 4 ounces of solution gently into the right (top) nostril. Breathe normally through your mouth. In a few seconds the solution should come out through your left nostril. Rotate your head and repeat the process on the left side.    2.  Adjust your head position as needed so the solution does not go down the back of your throat or into your ears.    3.  Blow your nose very gently to prevent the solution from going into your ear and causing discomfort.    4.  After using the rinse, you may continue using your prescribed nasal medications as normal. You may notice that they work better.    Do not use sinus rinses if your nasal passageway is severely blocked. As with any medical product, be sure to speak to your doctor about using sinus rinses and stop using if you experience pain, nosebleeds or other problems.

## 2022-02-23 NOTE — PROGRESS NOTES
"Subjective:     CC: Cough    HPI:   Logan presents today with complaint of 1 week of cough and congestion.  He reports symptoms started after receiving his second shot of the Covid vaccine (Moderna).  Initially after the shot he had some generalized body aches and chills, no measured fever.  Since then has had persistent cough that is becoming increasingly bothersome.  No shortness of breath or chest pain.  He does not smoke tobacco but frequently uses cannabis, he mostly uses a vaporizer    Current Outpatient Medications Ordered in Epic   Medication Sig Dispense Refill   • benzonatate (TESSALON) 100 MG Cap Take 1 Capsule by mouth 3 times a day as needed for Cough for up to 10 days. 60 Capsule 0   • ketoconazole (NIZORAL) 2 % shampoo Apply to affected area of damp skin, lather, leave on 5 minutes, and rinse (Patient not taking: Reported on 2/22/2022) 100 mL 2   • mupirocin (BACTROBAN) 2 % Ointment Apply 1 Application topically 2 times a day. (Patient not taking: No sig reported) 22 g 0     No current Epic-ordered facility-administered medications on file.       ROS:  Gen: no fevers/chills, no changes in weight  Eyes: no changes in vision  ENT: no sore throat, no hearing loss, no bloody nose  Pulm: no sob, no cough  CV: no chest pain, no palpitations  GI: no nausea/vomiting, no diarrhea  : no dysuria  MSk: no myalgias  Skin: no rash  Neuro: no headaches, no numbness/tingling  Heme/Lymph: no easy bruising      Objective:     Exam:  /82   Pulse 71   Temp 36.6 °C (97.8 °F) (Temporal)   Ht 1.702 m (5' 7\")   Wt 68.9 kg (151 lb 12.8 oz)   SpO2 98%   BMI 23.78 kg/m²  Body mass index is 23.78 kg/m².    Gen: Alert and oriented, No apparent distress.  Neck: Neck is supple without lymphadenopathy.  Lungs: Frequent coarse cough normal breathing effort, mild expiratory wheeze  CV: Regular rate and rhythm. No murmurs, rubs, or gallops.  Ext: No clubbing, cyanosis, edema.      Assessment & Plan:     26 y.o. male with " the following -     1. Cough  We will check for Covid, will also get chest x-ray and blood work  Symptomatic treatment, sent Tessalon Perles, can continue guaifenesin, Tylenol as needed.  Follow-up if no improvement or worsens  - SARS-CoV-2 PCR (24 hour In-House): Collect NP swab in VTM; Future  - DX-CHEST-2 VIEWS; Future  - benzonatate (TESSALON) 100 MG Cap; Take 1 Capsule by mouth 3 times a day as needed for Cough for up to 10 days.  Dispense: 60 Capsule; Refill: 0  - PROCALCITONIN; Future  - CBC WITH DIFFERENTIAL; Future     Please note that this dictation was created using voice recognition software. I have made every reasonable attempt to correct obvious errors, but I expect that there are errors of grammar and possibly content that I did not discover before finalizing the note.

## 2022-03-08 ENCOUNTER — OFFICE VISIT (OUTPATIENT)
Dept: MEDICAL GROUP | Facility: MEDICAL CENTER | Age: 27
End: 2022-03-08
Payer: COMMERCIAL

## 2022-03-08 VITALS
HEART RATE: 82 BPM | WEIGHT: 152 LBS | SYSTOLIC BLOOD PRESSURE: 104 MMHG | OXYGEN SATURATION: 97 % | DIASTOLIC BLOOD PRESSURE: 62 MMHG | BODY MASS INDEX: 23.86 KG/M2 | TEMPERATURE: 98.3 F | HEIGHT: 67 IN

## 2022-03-08 DIAGNOSIS — N47.1 PHIMOSIS: ICD-10-CM

## 2022-03-08 DIAGNOSIS — B35.6 TINEA CRURIS: ICD-10-CM

## 2022-03-08 DIAGNOSIS — N48.1 BALANITIS: ICD-10-CM

## 2022-03-08 PROCEDURE — 99213 OFFICE O/P EST LOW 20 MIN: CPT | Performed by: STUDENT IN AN ORGANIZED HEALTH CARE EDUCATION/TRAINING PROGRAM

## 2022-03-08 RX ORDER — KETOCONAZOLE 20 MG/ML
SHAMPOO TOPICAL
Qty: 100 ML | Refills: 2 | Status: SHIPPED | OUTPATIENT
Start: 2022-03-08 | End: 2022-07-26

## 2022-03-08 RX ORDER — FLUCONAZOLE 150 MG/1
150 TABLET ORAL
Qty: 2 TABLET | Refills: 0 | Status: SHIPPED | OUTPATIENT
Start: 2022-03-08 | End: 2022-07-26

## 2022-03-09 NOTE — PROGRESS NOTES
"Subjective:     CC: Irritation of penis    HPI:   Logan presents today with complaint of continued irritation of his penis.  His tinea cruris has improved with the ketoconazole shampoo, and the irritation of his foreskin and tip of his penis did improve with this shampoo as well and had resolved for a few weeks however it has returned, and is now getting to the point where intermittently he is having phimosis due to the irritation and unable to retract his foreskin.  He would like to see a urologist for this problem.  Is also low on the ketoconazole shampoo.        Current Outpatient Medications Ordered in Epic   Medication Sig Dispense Refill   • fluconazole (DIFLUCAN) 150 MG tablet Take 1 Tablet by mouth every 7 days. 2 Tablet 0   • ketoconazole (NIZORAL) 2 % shampoo Apply to affected area of damp skin, lather, leave on 5 minutes, and rinse 100 mL 2   • mupirocin (BACTROBAN) 2 % Ointment Apply 1 Application topically 2 times a day. 22 g 0     No current Epic-ordered facility-administered medications on file.       ROS:  Gen: no fevers/chills, no changes in weight  Eyes: no changes in vision  ENT: no sore throat, no hearing loss, no bloody nose  Pulm: no sob, no cough  CV: no chest pain, no palpitations  GI: no nausea/vomiting, no diarrhea  : no dysuria  MSk: no myalgias  Skin: no rash  Neuro: no headaches, no numbness/tingling  Heme/Lymph: no easy bruising      Objective:     Exam:  /62 (BP Location: Left arm, Patient Position: Sitting, BP Cuff Size: Adult)   Pulse 82   Temp 36.8 °C (98.3 °F) (Temporal)   Ht 1.702 m (5' 7\")   Wt 68.9 kg (152 lb)   SpO2 97%   BMI 23.81 kg/m²  Body mass index is 23.81 kg/m².    Gen: Alert and oriented, No apparent distress.  Neck: Neck is supple without lymphadenopathy.  Lungs: Normal effort, CTA bilaterally, no wheezes, rhonchi, or rales  CV: Regular rate and rhythm. No murmurs, rubs, or gallops.  Ext: No clubbing, cyanosis, edema.      Assessment & Plan:     26 y.o. " male with the following -     1. Balanitis  2. Phimosis  3. Tinea cruris  We will treat with oral fluconazole for 2 weeks, continue the ketoconazole shampoo.  Given his recurrent phimosis secondary to balanitis, will do urology referral.  Encouraged good hygiene, daily showers and regular cleaning of foreskin and glans  - fluconazole (DIFLUCAN) 150 MG tablet; Take 1 Tablet by mouth every 7 days.  Dispense: 2 Tablet; Refill: 0  - Referral to Urology  - ketoconazole (NIZORAL) 2 % shampoo; Apply to affected area of damp skin, lather, leave on 5 minutes, and rinse  Dispense: 100 mL; Refill: 2         Please note that this dictation was created using voice recognition software. I have made every reasonable attempt to correct obvious errors, but I expect that there are errors of grammar and possibly content that I did not discover before finalizing the note.

## 2022-07-26 ENCOUNTER — OFFICE VISIT (OUTPATIENT)
Dept: URGENT CARE | Facility: CLINIC | Age: 27
End: 2022-07-26
Payer: MEDICAID

## 2022-07-26 ENCOUNTER — HOSPITAL ENCOUNTER (OUTPATIENT)
Facility: MEDICAL CENTER | Age: 27
End: 2022-07-26
Attending: PHYSICIAN ASSISTANT
Payer: MEDICAID

## 2022-07-26 VITALS
RESPIRATION RATE: 14 BRPM | WEIGHT: 147 LBS | BODY MASS INDEX: 23.07 KG/M2 | HEIGHT: 67 IN | OXYGEN SATURATION: 97 % | SYSTOLIC BLOOD PRESSURE: 128 MMHG | HEART RATE: 71 BPM | DIASTOLIC BLOOD PRESSURE: 76 MMHG | TEMPERATURE: 98.6 F

## 2022-07-26 DIAGNOSIS — R30.0 DYSURIA: ICD-10-CM

## 2022-07-26 LAB
APPEARANCE UR: CLEAR
BILIRUB UR STRIP-MCNC: NEGATIVE MG/DL
COLOR UR AUTO: YELLOW
GLUCOSE UR STRIP.AUTO-MCNC: NEGATIVE MG/DL
KETONES UR STRIP.AUTO-MCNC: NEGATIVE MG/DL
LEUKOCYTE ESTERASE UR QL STRIP.AUTO: NEGATIVE
NITRITE UR QL STRIP.AUTO: NEGATIVE
PH UR STRIP.AUTO: 6.5 [PH] (ref 5–8)
PROT UR QL STRIP: NEGATIVE MG/DL
RBC UR QL AUTO: NEGATIVE
SP GR UR STRIP.AUTO: <=1.005
UROBILINOGEN UR STRIP-MCNC: 0.2 MG/DL

## 2022-07-26 PROCEDURE — 87491 CHLMYD TRACH DNA AMP PROBE: CPT

## 2022-07-26 PROCEDURE — 87591 N.GONORRHOEAE DNA AMP PROB: CPT

## 2022-07-26 PROCEDURE — 87086 URINE CULTURE/COLONY COUNT: CPT

## 2022-07-26 PROCEDURE — 99213 OFFICE O/P EST LOW 20 MIN: CPT | Performed by: PHYSICIAN ASSISTANT

## 2022-07-26 PROCEDURE — 81002 URINALYSIS NONAUTO W/O SCOPE: CPT | Performed by: PHYSICIAN ASSISTANT

## 2022-07-26 ASSESSMENT — ENCOUNTER SYMPTOMS
ABDOMINAL PAIN: 0
CHILLS: 0
HEADACHES: 0
FEVER: 0
VOMITING: 0
FLANK PAIN: 0
DIARRHEA: 0
NAUSEA: 0
BACK PAIN: 0

## 2022-07-27 NOTE — PROGRESS NOTES
"Subjective     Logan Mason is a 26 y.o. male who presents with Dysuria            Dysuria   This is a new problem. The current episode started today. The problem occurs intermittently. The problem has been waxing and waning (at the beginning and end of urination). The quality of the pain is described as aching and burning. The patient is experiencing no pain. There has been no fever. He is sexually active. Pertinent negatives include no chills, discharge, flank pain, frequency, hematuria, hesitancy, nausea, urgency or vomiting. He has tried nothing for the symptoms. There is no history of recurrent UTIs.     Patient is  and in a monogamous relationship. Denies STD exposure.    History reviewed. No pertinent past medical history.  History reviewed. No pertinent surgical history.    Family History   Problem Relation Age of Onset   • Hypertension Mother        No Known Allergies    Medications, Allergies, and current problem list reviewed today in Epic      Review of Systems   Constitutional: Negative for chills, fever and malaise/fatigue.   Gastrointestinal: Negative for abdominal pain, diarrhea, nausea and vomiting.   Genitourinary: Positive for dysuria. Negative for flank pain, frequency, hematuria, hesitancy and urgency.   Musculoskeletal: Negative for back pain.   Neurological: Negative for headaches.     All other systems reviewed and are negative.            Objective     /76 (BP Location: Right arm, Patient Position: Sitting, BP Cuff Size: Adult long)   Pulse 71   Temp 37 °C (98.6 °F) (Temporal)   Resp 14   Ht 1.702 m (5' 7\")   Wt 66.7 kg (147 lb)   SpO2 97%   BMI 23.02 kg/m²      Physical Exam  Constitutional:       General: He is not in acute distress.     Appearance: He is not ill-appearing.   HENT:      Head: Normocephalic and atraumatic.   Eyes:      Conjunctiva/sclera: Conjunctivae normal.   Cardiovascular:      Rate and Rhythm: Normal rate and regular rhythm.   Pulmonary:      " Effort: Pulmonary effort is normal. No respiratory distress.      Breath sounds: No stridor. No wheezing.   Genitourinary:     Comments: deferred  Skin:     General: Skin is warm.   Neurological:      General: No focal deficit present.      Mental Status: He is alert and oriented to person, place, and time.   Psychiatric:         Mood and Affect: Mood normal.         Behavior: Behavior normal.         Thought Content: Thought content normal.         Judgment: Judgment normal.                  Lab Results   Component Value Date/Time    POCCOLOR Yellow 07/26/2022 06:19 PM    POCAPPEAR Clear 07/26/2022 06:19 PM    POCLEUKEST Negative 07/26/2022 06:19 PM    POCNITRITE Negative 07/26/2022 06:19 PM    POCUROBILIGE 0.2 07/26/2022 06:19 PM    POCPROTEIN Negative 07/26/2022 06:19 PM    POCURPH 6.5 07/26/2022 06:19 PM    POCBLOOD Negative 07/26/2022 06:19 PM    POCSPGRV <=1.005 07/26/2022 06:19 PM    POCKETONES Negative 07/26/2022 06:19 PM    POCBILIRUBIN Negative 07/26/2022 06:19 PM    POCGLUCUA Negative 07/26/2022 06:19 PM                 Assessment & Plan        1. Dysuria  POCT Urinalysis    URINE CULTURE(NEW)    Chlamydia/GC, PCR (Urine)       UA normal.  Check urine culture and GC/Chlamyida .    Differential diagnoses, Supportive care, and indications for immediate follow-up discussed with patient.   Pathogenesis of diagnosis discussed including typical length and natural progression.   Instructed to return to clinic or nearest emergency department for any change in condition, further concerns, or worsening of symptoms.    The patient demonstrated a good understanding and agreed with the treatment plan.    Sabi Rivera P.A.-C.

## 2022-07-28 LAB
C TRACH DNA SPEC QL NAA+PROBE: NEGATIVE
N GONORRHOEA DNA SPEC QL NAA+PROBE: NEGATIVE
SPECIMEN SOURCE: NORMAL

## 2022-07-29 LAB
BACTERIA UR CULT: NORMAL
SIGNIFICANT IND 70042: NORMAL
SITE SITE: NORMAL
SOURCE SOURCE: NORMAL

## 2022-09-07 ENCOUNTER — OFFICE VISIT (OUTPATIENT)
Dept: URGENT CARE | Facility: CLINIC | Age: 27
End: 2022-09-07
Payer: MEDICAID

## 2022-09-07 VITALS
HEART RATE: 74 BPM | BODY MASS INDEX: 23.07 KG/M2 | DIASTOLIC BLOOD PRESSURE: 74 MMHG | OXYGEN SATURATION: 98 % | HEIGHT: 67 IN | SYSTOLIC BLOOD PRESSURE: 110 MMHG | WEIGHT: 147 LBS | TEMPERATURE: 97.4 F | RESPIRATION RATE: 16 BRPM

## 2022-09-07 DIAGNOSIS — M25.571 ACUTE RIGHT ANKLE PAIN: ICD-10-CM

## 2022-09-07 PROCEDURE — 99213 OFFICE O/P EST LOW 20 MIN: CPT

## 2022-09-07 RX ORDER — NAPROXEN 500 MG/1
500 TABLET ORAL 2 TIMES DAILY WITH MEALS
Qty: 10 TABLET | Refills: 0 | Status: SHIPPED | OUTPATIENT
Start: 2022-09-07 | End: 2022-09-12

## 2022-09-07 ASSESSMENT — ENCOUNTER SYMPTOMS: FALLS: 0

## 2022-09-07 NOTE — LETTER
September 7, 2022      To Whom It May Concern:         This is confirmation that Logan Isabella attended his scheduled appointment with AKIL Murdock on 9/07/22. Please excuse him from work 9/7/22-9/8/22.          If you have any questions please do not hesitate to call me at the phone number listed below.      Sincerely,      Kari Malloy A.P.R.N.  [phone]

## 2022-09-07 NOTE — PROGRESS NOTES
"Subjective:   Logan Mason is a 26 y.o. male who presents for Ankle Pain (No know cause, thinks he may have twisted it)      HPI:  Right ankle pain-this is a new problem.  Patient reports right ankle pain x2 days.  Patient reports pain gradually came on 2 days ago without injury.  He denies trauma to right ankle.  He denies any recent excessive or strenuous activities.  He has been ambulatory.  Pain is 1\10 today while sitting.  He does report exacerbated pain with movements and bearing weight.  He has not taken anything for his pain.  He denies numbness or tingling to left foot.      Review of Systems   Musculoskeletal:  Positive for joint pain. Negative for falls.   All other systems reviewed and are negative.    Medications:    No current outpatient medications on file prior to visit.     No current facility-administered medications on file prior to visit.        Allergies:   Patient has no known allergies.    Problem List:   Patient Active Problem List   Diagnosis    Back pain    Flu vaccine need    Soft tissue mass    Tinea cruris        Surgical History:  No past surgical history on file.    Past Social Hx:   Social History     Tobacco Use    Smoking status: Never    Smokeless tobacco: Never   Vaping Use    Vaping Use: Never used   Substance Use Topics    Alcohol use: Yes     Comment: rare occassions    Drug use: Yes     Frequency: 7.0 times per week     Types: Marijuana, Inhaled          Problem list, medications, and allergies reviewed by myself today in Epic.     Objective:     /74   Pulse 74   Temp 36.3 °C (97.4 °F) (Temporal)   Resp 16   Ht 1.702 m (5' 7\")   Wt 66.7 kg (147 lb)   SpO2 98%   BMI 23.02 kg/m²     Physical Exam  Vitals and nursing note reviewed.   Constitutional:       General: He is awake. He is not in acute distress.     Appearance: Normal appearance. He is normal weight. He is not ill-appearing, toxic-appearing or diaphoretic.   HENT:      Head: Normocephalic and atraumatic. "   Cardiovascular:      Rate and Rhythm: Normal rate.      Pulses: Normal pulses.   Pulmonary:      Effort: Pulmonary effort is normal.   Musculoskeletal:        Feet:    Feet:      Comments: Left ankle: No obvious abnormality identified, no swelling, no erythema, no increased warmth, no bony tenderness over lateral malleolus, no surface trauma.  No laxity.  Full range of motion with pain.  Neurovascular intact.  Skin:     General: Skin is warm and dry.      Capillary Refill: Capillary refill takes less than 2 seconds.   Neurological:      General: No focal deficit present.      Mental Status: He is alert and oriented to person, place, and time. Mental status is at baseline.      Cranial Nerves: No cranial nerve deficit.      Sensory: No sensory deficit.      Motor: No weakness.      Coordination: Coordination normal.      Gait: Gait normal.      Deep Tendon Reflexes: Reflexes normal.   Psychiatric:         Mood and Affect: Mood normal.         Behavior: Behavior normal. Behavior is cooperative.         Thought Content: Thought content normal.         Judgment: Judgment normal.       Assessment/Plan:     Diagnosis and associated orders:   1. Acute right ankle pain  naproxen (NAPROSYN) 500 MG Tab             Comments/MDM:   Pt is clinically stable at today's acute urgent care visit.  No acute distress noted. Appropriate for outpatient management at this time.     Acute problem.  Differentials include strain versus tendinitis versus sprain.  X-rays not indicated at this time as there is no bony tenderness, acute trauma or injury, patient is ambulatory on left ankle.  Supportive measures will be trialed at this time.  Patient will be given ankle brace for support.  Advised patient to rest, elevate, alternate heat and ice application, begin taking naproxen as prescribed.  Patient agreeable and verbalizes understanding.  He is to return for any new or worsening signs or symptoms, or for symptoms that do not improve.  Work  note provided.           Discussed DDx, management options (risks,benefits, and alternatives to planned treatment), natural progression and supportive care.  Expressed understanding and the treatment plan was agreed upon. Questions were encouraged and answered   Return to urgent care prn if new or worsening sx or if there is no improvement in condition prn.    Educated in Red flags and indications to immediately call 911 or present to the Emergency Department.   Advised the patient to follow-up with the primary care physician for recheck, reevaluation, and consideration of further management.    I personally reviewed prior external notes and test results pertinent to today's visit.  I have independently reviewed and interpreted all diagnostics ordered during this urgent care acute visit.       Please note that this dictation was created using voice recognition software. I have made a reasonable attempt to correct obvious errors, but I expect that there are errors of grammar and possibly content that I did not discover before finalizing the note.    This note was electronically signed by JEREL Dunham

## 2022-09-20 ENCOUNTER — HOSPITAL ENCOUNTER (OUTPATIENT)
Facility: MEDICAL CENTER | Age: 27
End: 2022-09-20
Attending: PHYSICIAN ASSISTANT
Payer: MEDICAID

## 2022-09-20 ENCOUNTER — OFFICE VISIT (OUTPATIENT)
Dept: URGENT CARE | Facility: CLINIC | Age: 27
End: 2022-09-20
Payer: MEDICAID

## 2022-09-20 VITALS
TEMPERATURE: 97.4 F | DIASTOLIC BLOOD PRESSURE: 72 MMHG | SYSTOLIC BLOOD PRESSURE: 108 MMHG | OXYGEN SATURATION: 100 % | HEIGHT: 67 IN | HEART RATE: 71 BPM | WEIGHT: 144.8 LBS | RESPIRATION RATE: 14 BRPM | BODY MASS INDEX: 22.73 KG/M2

## 2022-09-20 DIAGNOSIS — R52 BODY ACHES: ICD-10-CM

## 2022-09-20 DIAGNOSIS — Z20.822 CLOSE EXPOSURE TO COVID-19 VIRUS: ICD-10-CM

## 2022-09-20 LAB
EXTERNAL QUALITY CONTROL: NORMAL
INT CON NEG: NEGATIVE
INT CON POS: POSITIVE
SARS-COV+SARS-COV-2 AG RESP QL IA.RAPID: NEGATIVE

## 2022-09-20 PROCEDURE — 87426 SARSCOV CORONAVIRUS AG IA: CPT | Performed by: PHYSICIAN ASSISTANT

## 2022-09-20 PROCEDURE — U0005 INFEC AGEN DETEC AMPLI PROBE: HCPCS

## 2022-09-20 PROCEDURE — U0003 INFECTIOUS AGENT DETECTION BY NUCLEIC ACID (DNA OR RNA); SEVERE ACUTE RESPIRATORY SYNDROME CORONAVIRUS 2 (SARS-COV-2) (CORONAVIRUS DISEASE [COVID-19]), AMPLIFIED PROBE TECHNIQUE, MAKING USE OF HIGH THROUGHPUT TECHNOLOGIES AS DESCRIBED BY CMS-2020-01-R: HCPCS

## 2022-09-20 PROCEDURE — 99213 OFFICE O/P EST LOW 20 MIN: CPT | Mod: CS | Performed by: PHYSICIAN ASSISTANT

## 2022-09-20 ASSESSMENT — ENCOUNTER SYMPTOMS
VOMITING: 0
NAUSEA: 0
SORE THROAT: 0
TINGLING: 0
CHANGE IN BOWEL HABIT: 0
FEVER: 0
HEADACHES: 0
FOCAL WEAKNESS: 0
MYALGIAS: 1
WHEEZING: 0
CHILLS: 1
ANOREXIA: 0
SHORTNESS OF BREATH: 0
FATIGUE: 1
COUGH: 0
BODY ACHES: 1
SENSORY CHANGE: 0

## 2022-09-20 NOTE — PROGRESS NOTES
"Subjective     Logan Mason is a 26 y.o. male who presents with Body Aches (X 2 days with nausea and chills.  Denies fever.  Friend tested Positive for Covid. )            Generalized Body Aches  This is a new problem. Episode onset: 2 days. The problem occurs intermittently. Progression since onset: improved today. Associated symptoms include chills, fatigue and myalgias. Pertinent negatives include no anorexia, change in bowel habit, chest pain, congestion, coughing, fever, headaches, nausea, rash, sore throat or vomiting. Associated symptoms comments: nausea. Nothing aggravates the symptoms. He has tried nothing for the symptoms.      The patient's friend recently tested positive for COVID.    History reviewed. No pertinent past medical history.    History reviewed. No pertinent surgical history.      Family History   Problem Relation Age of Onset    Hypertension Mother        No Known Allergies    Medications, Allergies, and current problem list reviewed today in Epic    Review of Systems   Constitutional:  Positive for chills, fatigue and malaise/fatigue. Negative for fever.   HENT:  Negative for congestion and sore throat.    Respiratory:  Negative for cough, shortness of breath and wheezing.    Cardiovascular:  Negative for chest pain.   Gastrointestinal:  Negative for anorexia, change in bowel habit, nausea and vomiting.   Musculoskeletal:  Positive for myalgias.   Skin:  Negative for rash.   Neurological:  Negative for tingling, sensory change, focal weakness and headaches.      All other systems reviewed and are negative.         Objective     /72   Pulse 71   Temp 36.3 °C (97.4 °F) (Temporal)   Resp 14   Ht 1.702 m (5' 7\")   Wt 65.7 kg (144 lb 12.8 oz)   SpO2 100%   BMI 22.68 kg/m²      Physical Exam  Constitutional:       General: He is not in acute distress.     Appearance: He is not ill-appearing.   HENT:      Head: Normocephalic and atraumatic.      Mouth/Throat:      Mouth: Mucous " membranes are moist.      Pharynx: No posterior oropharyngeal erythema.   Eyes:      Conjunctiva/sclera: Conjunctivae normal.   Cardiovascular:      Rate and Rhythm: Normal rate and regular rhythm.   Pulmonary:      Effort: Pulmonary effort is normal. No respiratory distress.      Breath sounds: No stridor. No wheezing.   Skin:     General: Skin is warm and dry.   Neurological:      General: No focal deficit present.      Mental Status: He is alert and oriented to person, place, and time.   Psychiatric:         Mood and Affect: Mood normal.         Behavior: Behavior normal.         Thought Content: Thought content normal.         Judgment: Judgment normal.                           Assessment & Plan        1. Body aches      2. Close exposure to COVID-19 virus    - SARS-CoV-2 PCR (24 hour In-House): Collect NP swab in VTM; Future  - POCT SARS-COV Antigen LATRICE (Symptomatic only)- negative    COVID PCR pending.  Isolation guidelines, conservative measures and ER precautions discussed.     Viral etiology discussed. Continue conservative treatment.    Differential diagnoses, Supportive care, and indications for immediate follow-up discussed with patient.   Pathogenesis of diagnosis discussed including typical length and natural progression.   Instructed to return to clinic or nearest emergency department for any change in condition, further concerns, or worsening of symptoms.    The patient demonstrated a good understanding and agreed with the treatment plan.    Sabi Rivera P.A.-C.

## 2022-09-21 DIAGNOSIS — R52 BODY ACHES: ICD-10-CM

## 2022-09-21 DIAGNOSIS — Z20.822 CLOSE EXPOSURE TO COVID-19 VIRUS: ICD-10-CM

## 2022-09-21 LAB
COVID ORDER STATUS COVID19: NORMAL
SARS-COV-2 RNA RESP QL NAA+PROBE: NOTDETECTED
SPECIMEN SOURCE: NORMAL

## 2023-01-21 ENCOUNTER — HOSPITAL ENCOUNTER (OUTPATIENT)
Dept: LAB | Facility: MEDICAL CENTER | Age: 28
End: 2023-01-21
Attending: STUDENT IN AN ORGANIZED HEALTH CARE EDUCATION/TRAINING PROGRAM
Payer: MEDICAID

## 2023-01-21 DIAGNOSIS — R05.9 COUGH: ICD-10-CM

## 2023-01-21 LAB
BASOPHILS # BLD AUTO: 0.8 % (ref 0–1.8)
BASOPHILS # BLD: 0.05 K/UL (ref 0–0.12)
EOSINOPHIL # BLD AUTO: 0.22 K/UL (ref 0–0.51)
EOSINOPHIL NFR BLD: 3.6 % (ref 0–6.9)
ERYTHROCYTE [DISTWIDTH] IN BLOOD BY AUTOMATED COUNT: 40.5 FL (ref 35.9–50)
HCT VFR BLD AUTO: 45.1 % (ref 42–52)
HGB BLD-MCNC: 15.8 G/DL (ref 14–18)
IMM GRANULOCYTES # BLD AUTO: 0.02 K/UL (ref 0–0.11)
IMM GRANULOCYTES NFR BLD AUTO: 0.3 % (ref 0–0.9)
LYMPHOCYTES # BLD AUTO: 2.16 K/UL (ref 1–4.8)
LYMPHOCYTES NFR BLD: 35 % (ref 22–41)
MCH RBC QN AUTO: 31.3 PG (ref 27–33)
MCHC RBC AUTO-ENTMCNC: 35 G/DL (ref 33.7–35.3)
MCV RBC AUTO: 89.5 FL (ref 81.4–97.8)
MONOCYTES # BLD AUTO: 0.42 K/UL (ref 0–0.85)
MONOCYTES NFR BLD AUTO: 6.8 % (ref 0–13.4)
NEUTROPHILS # BLD AUTO: 3.31 K/UL (ref 1.82–7.42)
NEUTROPHILS NFR BLD: 53.5 % (ref 44–72)
NRBC # BLD AUTO: 0 K/UL
NRBC BLD-RTO: 0 /100 WBC
PLATELET # BLD AUTO: 229 K/UL (ref 164–446)
PMV BLD AUTO: 9.7 FL (ref 9–12.9)
PROCALCITONIN SERPL-MCNC: <0.05 NG/ML
RBC # BLD AUTO: 5.04 M/UL (ref 4.7–6.1)
WBC # BLD AUTO: 6.2 K/UL (ref 4.8–10.8)

## 2023-01-21 PROCEDURE — 85025 COMPLETE CBC W/AUTO DIFF WBC: CPT

## 2023-01-21 PROCEDURE — 36415 COLL VENOUS BLD VENIPUNCTURE: CPT

## 2023-01-21 PROCEDURE — 84145 PROCALCITONIN (PCT): CPT

## 2023-02-05 SDOH — ECONOMIC STABILITY: FOOD INSECURITY: WITHIN THE PAST 12 MONTHS, YOU WORRIED THAT YOUR FOOD WOULD RUN OUT BEFORE YOU GOT MONEY TO BUY MORE.: SOMETIMES TRUE

## 2023-02-05 SDOH — ECONOMIC STABILITY: FOOD INSECURITY: WITHIN THE PAST 12 MONTHS, THE FOOD YOU BOUGHT JUST DIDN'T LAST AND YOU DIDN'T HAVE MONEY TO GET MORE.: SOMETIMES TRUE

## 2023-02-05 SDOH — ECONOMIC STABILITY: TRANSPORTATION INSECURITY
IN THE PAST 12 MONTHS, HAS THE LACK OF TRANSPORTATION KEPT YOU FROM MEDICAL APPOINTMENTS OR FROM GETTING MEDICATIONS?: NO

## 2023-02-05 SDOH — ECONOMIC STABILITY: HOUSING INSECURITY
IN THE LAST 12 MONTHS, WAS THERE A TIME WHEN YOU DID NOT HAVE A STEADY PLACE TO SLEEP OR SLEPT IN A SHELTER (INCLUDING NOW)?: NO

## 2023-02-05 SDOH — ECONOMIC STABILITY: INCOME INSECURITY: HOW HARD IS IT FOR YOU TO PAY FOR THE VERY BASICS LIKE FOOD, HOUSING, MEDICAL CARE, AND HEATING?: SOMEWHAT HARD

## 2023-02-05 SDOH — ECONOMIC STABILITY: TRANSPORTATION INSECURITY
IN THE PAST 12 MONTHS, HAS LACK OF TRANSPORTATION KEPT YOU FROM MEETINGS, WORK, OR FROM GETTING THINGS NEEDED FOR DAILY LIVING?: NO

## 2023-02-05 SDOH — ECONOMIC STABILITY: TRANSPORTATION INSECURITY
IN THE PAST 12 MONTHS, HAS LACK OF RELIABLE TRANSPORTATION KEPT YOU FROM MEDICAL APPOINTMENTS, MEETINGS, WORK OR FROM GETTING THINGS NEEDED FOR DAILY LIVING?: NO

## 2023-02-05 SDOH — HEALTH STABILITY: PHYSICAL HEALTH: ON AVERAGE, HOW MANY MINUTES DO YOU ENGAGE IN EXERCISE AT THIS LEVEL?: 150+ MIN

## 2023-02-05 SDOH — ECONOMIC STABILITY: HOUSING INSECURITY: IN THE LAST 12 MONTHS, HOW MANY PLACES HAVE YOU LIVED?: 1

## 2023-02-05 SDOH — HEALTH STABILITY: PHYSICAL HEALTH: ON AVERAGE, HOW MANY DAYS PER WEEK DO YOU ENGAGE IN MODERATE TO STRENUOUS EXERCISE (LIKE A BRISK WALK)?: 5 DAYS

## 2023-02-05 SDOH — ECONOMIC STABILITY: INCOME INSECURITY: IN THE LAST 12 MONTHS, WAS THERE A TIME WHEN YOU WERE NOT ABLE TO PAY THE MORTGAGE OR RENT ON TIME?: YES

## 2023-02-05 SDOH — HEALTH STABILITY: MENTAL HEALTH
STRESS IS WHEN SOMEONE FEELS TENSE, NERVOUS, ANXIOUS, OR CAN'T SLEEP AT NIGHT BECAUSE THEIR MIND IS TROUBLED. HOW STRESSED ARE YOU?: ONLY A LITTLE

## 2023-02-05 SDOH — ECONOMIC STABILITY: HOUSING INSECURITY
IN THE LAST 12 MONTHS, WAS THERE A TIME WHEN YOU DID NOT HAVE A STEADY PLACE TO SLEEP OR SLEPT IN A SHELTER (INCLUDING NOW)?: YES

## 2023-02-05 ASSESSMENT — SOCIAL DETERMINANTS OF HEALTH (SDOH)
HOW OFTEN DO YOU GET TOGETHER WITH FRIENDS OR RELATIVES?: NEVER
HOW OFTEN DO YOU ATTENT MEETINGS OF THE CLUB OR ORGANIZATION YOU BELONG TO?: NEVER
HOW HARD IS IT FOR YOU TO PAY FOR THE VERY BASICS LIKE FOOD, HOUSING, MEDICAL CARE, AND HEATING?: SOMEWHAT HARD
HOW OFTEN DO YOU ATTEND CHURCH OR RELIGIOUS SERVICES?: NEVER
HOW OFTEN DO YOU ATTEND CHURCH OR RELIGIOUS SERVICES?: NEVER
WITHIN THE PAST 12 MONTHS, YOU WORRIED THAT YOUR FOOD WOULD RUN OUT BEFORE YOU GOT THE MONEY TO BUY MORE: SOMETIMES TRUE
DO YOU BELONG TO ANY CLUBS OR ORGANIZATIONS SUCH AS CHURCH GROUPS UNIONS, FRATERNAL OR ATHLETIC GROUPS, OR SCHOOL GROUPS?: NO
DO YOU BELONG TO ANY CLUBS OR ORGANIZATIONS SUCH AS CHURCH GROUPS UNIONS, FRATERNAL OR ATHLETIC GROUPS, OR SCHOOL GROUPS?: NO
IN A TYPICAL WEEK, HOW MANY TIMES DO YOU TALK ON THE PHONE WITH FAMILY, FRIENDS, OR NEIGHBORS?: NEVER
HOW OFTEN DO YOU GET TOGETHER WITH FRIENDS OR RELATIVES?: NEVER
HOW MANY DRINKS CONTAINING ALCOHOL DO YOU HAVE ON A TYPICAL DAY WHEN YOU ARE DRINKING: 1 OR 2
HOW OFTEN DO YOU HAVE A DRINK CONTAINING ALCOHOL: 2-3 TIMES A WEEK
HOW OFTEN DO YOU ATTENT MEETINGS OF THE CLUB OR ORGANIZATION YOU BELONG TO?: NEVER
IN A TYPICAL WEEK, HOW MANY TIMES DO YOU TALK ON THE PHONE WITH FAMILY, FRIENDS, OR NEIGHBORS?: NEVER
HOW OFTEN DO YOU HAVE SIX OR MORE DRINKS ON ONE OCCASION: NEVER

## 2023-02-05 ASSESSMENT — LIFESTYLE VARIABLES
AUDIT-C TOTAL SCORE: 3
HOW MANY STANDARD DRINKS CONTAINING ALCOHOL DO YOU HAVE ON A TYPICAL DAY: 1 OR 2
HOW OFTEN DO YOU HAVE SIX OR MORE DRINKS ON ONE OCCASION: NEVER
HOW OFTEN DO YOU HAVE A DRINK CONTAINING ALCOHOL: 2-3 TIMES A WEEK
SKIP TO QUESTIONS 9-10: 1

## 2023-02-06 ENCOUNTER — OFFICE VISIT (OUTPATIENT)
Dept: MEDICAL GROUP | Facility: MEDICAL CENTER | Age: 28
End: 2023-02-06
Attending: FAMILY MEDICINE
Payer: MEDICAID

## 2023-02-06 VITALS
RESPIRATION RATE: 16 BRPM | DIASTOLIC BLOOD PRESSURE: 76 MMHG | OXYGEN SATURATION: 98 % | WEIGHT: 148 LBS | TEMPERATURE: 97.7 F | SYSTOLIC BLOOD PRESSURE: 124 MMHG | HEIGHT: 69 IN | BODY MASS INDEX: 21.92 KG/M2 | HEART RATE: 84 BPM

## 2023-02-06 DIAGNOSIS — L24.0 CONTACT DERMATITIS AND ECZEMA DUE TO DETERGENTS: ICD-10-CM

## 2023-02-06 DIAGNOSIS — Z23 NEED FOR VACCINATION: ICD-10-CM

## 2023-02-06 PROBLEM — B35.6 TINEA CRURIS: Status: RESOLVED | Noted: 2022-01-21 | Resolved: 2023-02-06

## 2023-02-06 PROCEDURE — 99213 OFFICE O/P EST LOW 20 MIN: CPT | Mod: 25 | Performed by: FAMILY MEDICINE

## 2023-02-06 PROCEDURE — 90686 IIV4 VACC NO PRSV 0.5 ML IM: CPT

## 2023-02-06 PROCEDURE — 99213 OFFICE O/P EST LOW 20 MIN: CPT | Performed by: FAMILY MEDICINE

## 2023-02-06 ASSESSMENT — PATIENT HEALTH QUESTIONNAIRE - PHQ9: CLINICAL INTERPRETATION OF PHQ2 SCORE: 0

## 2023-02-06 NOTE — PROGRESS NOTES
Subjective:     CC:    Chief Complaint   Patient presents with    [facility]     Dry patch on pinky       HISTORY OF THE PRESENT ILLNESS: Patient is a 27 y.o. male. This pleasant patient is here today to establish primary care with me after former PCP left practice and discuss the following issues.   His prior PCP was Pablo Hector ; last seen around 3/2022.  Specialists   None current; previously saw Urology for tinea cruris which resolved with nystatin cream    Since last being seen denies recent hospitalizations or ER visits. Has been feeling generally well.  Main concern is 2-3 week rash over his left pinky knuckle  Started out as a dry patch and then became more cracked over time  Denies any specific trauma to area.  Has been using neosporin nightly with band air over.  Associated symptoms include itch and dryness, denies redness, swelling, warmth, or discharge from area.  Of note, he washes his hand frequently as he works as .  Denies any other rashes elsewhere on his body.  No history of eczema or atopic dermatitis as a child or previous to this.  Did have episode of tinea cruris which he saw urology for and was prescribed antifungal that resolved rash.    Healthcare Maintenance: Completed    Allergies: Patient has no known allergies.    [facility] #[id number] - [place], [place] - [address] [address]  Phone: [phone] Fax: [phone]      Current Outpatient Medications   Medication Sig Dispense Refill    hydrocortisone 2.5 % Ointment Apply fingertip amount to affected area twice a day for up to a week. 28.35 g 1     No current facility-administered medications for this visit.       History reviewed. No pertinent past medical history.    History reviewed. No pertinent surgical history.    Social History     Tobacco Use    Smoking status: Never    Smokeless tobacco: Never   Vaping Use    Vaping Use: Some days    Substances: THC    Devices: Pre-filled or  "refillable cartridge   Substance Use Topics    Alcohol use: Yes     Comment: rare occassions    Drug use: Yes     Frequency: 7.0 times per week     Types: Marijuana, Inhaled     Comment: daily since age [age]       Family History   Problem Relation Age of Onset    Hypertension Mother        ROS:   Gen: no fevers/chills  CV: no chest pain  Pulm: no shortness of breath          Objective:     /76   Pulse 84   Temp 36.5 °C (97.7 °F)   Resp 16   Ht 1.74 m (5' 8.5\")   Wt 67.1 kg (148 lb)   SpO2 98%   BMI 22.18 kg/m²   Physical Exam  Constitutional: Alert, no distress  Skin: No rashes in visible areas  Eye: Conjunctiva clear, lids normal  Respiratory: Unlabored respiratory effort, no cough  CV: RRR  MSK: Normal gait, moves all extremities  Skin: Circular dry patch with fissures approximately 1 cm x 2 cm over left fifth MCP   Neuro: Grossly non-focal   Psych: Alert and oriented x3, normal affect and mood      Assessment & Plan:   27 y.o. male with the following -    1. Contact dermatitis and eczema due to detergents  Acute issue; worsening.  Discussed diagnosis, causes, and usual treatments of dermatitis with patient.  Went over control measures- identifying/avoiding triggers, use of moisturizers/other medications.  - Discussed that neosporin is common cause of contact dermatitis and recommended avoidance.  - Encouraged to maintain hydration with emollients  - use mild soaps, hypoallergenic detergents.   - Trial of hydrocortisone 2.5 % Ointment; Apply fingertip amount to affected area twice a day for up to a week.  Dispense: 28.35 g; Refill: 1  - Follow up in 2-3 weeks if not improving.   - Patient understands and verbalizes agreement.    2. Need for vaccination  - INFLUENZA VACCINE QUAD INJ (PF)     Return in about 6 months (around 8/6/2023), or if symptoms worsen or fail to improve, for routine annual wellness exam.    Please note that this dictation was created using voice recognition software. I have made " every reasonable attempt to correct obvious errors, but I expect that there are errors of grammar and possibly content that I did not discover before finalizing the note.

## 2023-02-24 ENCOUNTER — OFFICE VISIT (OUTPATIENT)
Dept: URGENT CARE | Facility: CLINIC | Age: 28
End: 2023-02-24
Payer: MEDICAID

## 2023-02-24 ENCOUNTER — TELEPHONE (OUTPATIENT)
Dept: URGENT CARE | Facility: CLINIC | Age: 28
End: 2023-02-24

## 2023-02-24 VITALS
OXYGEN SATURATION: 97 % | HEIGHT: 68 IN | WEIGHT: 148 LBS | DIASTOLIC BLOOD PRESSURE: 80 MMHG | HEART RATE: 86 BPM | BODY MASS INDEX: 22.43 KG/M2 | TEMPERATURE: 98.1 F | RESPIRATION RATE: 16 BRPM | SYSTOLIC BLOOD PRESSURE: 122 MMHG

## 2023-02-24 DIAGNOSIS — T49.7X5A: ICD-10-CM

## 2023-02-24 DIAGNOSIS — R11.2 NAUSEA AND VOMITING, UNSPECIFIED VOMITING TYPE: ICD-10-CM

## 2023-02-24 PROBLEM — N47.6 BALANOPOSTHITIS: Status: ACTIVE | Noted: 2022-06-13

## 2023-02-24 PROCEDURE — 99215 OFFICE O/P EST HI 40 MIN: CPT | Performed by: FAMILY MEDICINE

## 2023-02-24 RX ORDER — ONDANSETRON 4 MG/1
4 TABLET, ORALLY DISINTEGRATING ORAL EVERY 8 HOURS PRN
Qty: 10 TABLET | Refills: 0 | Status: SHIPPED | OUTPATIENT
Start: 2023-02-24 | End: 2023-03-09

## 2023-02-24 RX ORDER — ONDANSETRON 4 MG/1
4 TABLET, ORALLY DISINTEGRATING ORAL ONCE
Status: COMPLETED | OUTPATIENT
Start: 2023-02-24 | End: 2023-02-24

## 2023-02-24 RX ORDER — OMEPRAZOLE 20 MG/1
20 CAPSULE, DELAYED RELEASE ORAL DAILY
Qty: 7 CAPSULE | Refills: 0 | Status: SHIPPED | OUTPATIENT
Start: 2023-02-24 | End: 2023-03-03

## 2023-02-24 RX ADMIN — ONDANSETRON 4 MG: 4 TABLET, ORALLY DISINTEGRATING ORAL at 11:18

## 2023-02-24 NOTE — LETTER
February 24, 2023         Patient: Logan Mason   YOB: 1995   Date of Visit: 2/24/2023           To Whom it May Concern:    Logan Mason was seen in my clinic on 2/24/2023.     Please excuse his work absence due to illness.    If you have any questions or concerns, please don't hesitate to call.        Sincerely,           Colten Plata M.D.  Electronically Signed

## 2023-02-24 NOTE — PROGRESS NOTES
"  Chief Complaint   Patient presents with    Emesis     Pt states ingested toothpaste and has since been feeling very nauseous        States that he accidentally ingested a small amount of toothpaste while brushing his teeth today and now has n/v.      - several episodes of nonbloody emesis and abd cramping.       -unable to keep fluids down                  No past medical history on file.    Social History     Tobacco Use    Smoking status: Never    Smokeless tobacco: Never   Vaping Use    Vaping Use: Some days    Substances: THC    Devices: Pre-filled or refillable cartridge   Substance Use Topics    Alcohol use: Yes     Comment: rare occassions    Drug use: Yes     Frequency: 7.0 times per week     Types: Marijuana, Inhaled     Comment: daily since age [age]                  Review of Systems   Constitutional: Negative for fever, chills and weight loss.   Respiratory: Negative for cough and wheezing.    Cardiovascular: Negative for chest pain.   Gastrointestinal:   Negative for  blood in stool.   Neurological: Negative for dizziness and headaches.   All other systems reviewed and are negative.         Objective:     /80   Pulse 86   Temp 36.7 °C (98.1 °F)   Resp 16   Ht 1.727 m (5' 8\")   Wt 67.1 kg (148 lb)   SpO2 97%       Physical Exam   Constitutional: pt is oriented to person, place, and time and appears well-developed. No distress.   HENT:   Head: Normocephalic and atraumatic.   Mouth/Throat: No oropharyngeal exudate.   Eyes: Conjunctivae are normal. No scleral icterus.   Cardiovascular: Normal rate, regular rhythm and normal heart sounds.    Pulmonary/Chest: Effort normal and breath sounds normal. No respiratory distress. Pt has no wheezes. Pt has no rales.   Abdominal: Normal appearance and bowel sounds are normal. There is no splenomegaly or hepatomegaly. There is no tenderness. There is no rebound, no guarding, no CVA tenderness and no tenderness at McBurney's point.   Lymphadenopathy:     Pt " has no cervical adenopathy.   Neurological: pt is alert and oriented to person, place, and time.   Skin: Skin is warm. Pt is not diaphoretic. No erythema.   Psychiatric:  behavior is normal.   Nursing note and vitals reviewed.              Assessment/Plan:      1. Nausea and vomiting, unspecified vomiting type      2. Adverse effect of toothpaste      Pt able to tolerate PO challenge after given 4mg Zofran    Posion control case # [id number].   Cased was d/w provider on call.   Treatment is primarily supportive and given the minute quantity ingested, there is unlikely to be any complications.     Pt with hx GERD and has some stomach discomfort, so will start on Prilosec x 7 d    Push clear fluids at home.       Follow up in one day  if no improvement, sooner if symptoms worsen.         - ondansetron (ZOFRAN ODT) dispertab 4 mg  - omeprazole (PRILOSEC) 20 MG delayed-release capsule; Take 1 Capsule by mouth every day for 7 days.  Dispense: 7 Capsule; Refill: 0  - ondansetron (ZOFRAN ODT) 4 MG TABLET DISPERSIBLE; Take 1 Tablet by mouth every 8 hours as needed for Nausea/Vomiting.  Dispense: 10 Tablet; Refill:       My total time spent caring for the patient on the day of the encounter was 40 minutes.   This does not include time spent on separately billable procedures/tests.

## 2023-03-09 ENCOUNTER — OCCUPATIONAL MEDICINE (OUTPATIENT)
Dept: URGENT CARE | Facility: CLINIC | Age: 28
End: 2023-03-09
Payer: COMMERCIAL

## 2023-03-09 VITALS
DIASTOLIC BLOOD PRESSURE: 90 MMHG | TEMPERATURE: 97.7 F | SYSTOLIC BLOOD PRESSURE: 130 MMHG | BODY MASS INDEX: 22.91 KG/M2 | WEIGHT: 151.2 LBS | HEIGHT: 68 IN | RESPIRATION RATE: 16 BRPM | OXYGEN SATURATION: 96 % | HEART RATE: 86 BPM

## 2023-03-09 DIAGNOSIS — S39.012A STRAIN OF LUMBAR REGION, INITIAL ENCOUNTER: ICD-10-CM

## 2023-03-09 DIAGNOSIS — M62.830 SPASM OF MUSCLE OF LOWER BACK: ICD-10-CM

## 2023-03-09 PROCEDURE — 99214 OFFICE O/P EST MOD 30 MIN: CPT | Performed by: NURSE PRACTITIONER

## 2023-03-09 RX ORDER — CYCLOBENZAPRINE HCL 5 MG
5-10 TABLET ORAL 3 TIMES DAILY PRN
Qty: 30 TABLET | Refills: 0 | Status: SHIPPED | OUTPATIENT
Start: 2023-03-09 | End: 2023-03-28

## 2023-03-09 ASSESSMENT — ENCOUNTER SYMPTOMS
NAUSEA: 0
BACK PAIN: 1
SORE THROAT: 0
FEVER: 0
MYALGIAS: 0
CHILLS: 0
EYE REDNESS: 0
SHORTNESS OF BREATH: 0
DIZZINESS: 0
VOMITING: 0

## 2023-03-09 NOTE — LETTER
"EMPLOYEE’S CLAIM FOR COMPENSATION/ REPORT OF INITIAL TREATMENT  FORM C-4    EMPLOYEE’S CLAIM - PROVIDE ALL INFORMATION REQUESTED   First Name  Logan Last Name  Isabella Birthdate                    1995                Sex  male Claim Number (Insurer’s Use Only)   Home Address  [address] Age  27 y.o. Height  1.727 m (5' 8\") Weight  68.6 kg (151 lb 3.2 oz) [facility]     [facility] Zip  [address] Telephone  [phone] (home)    Mailing Address  [address] [facility] Zip  [address] Primary Language Spoken  English    Insurer   Third-Party   [employer]   Employee's Occupation (Job Title) When Injury or Occupational Disease Occurred      Employer's Name/Company Name     Telephone      Office Mail Address (Number and Street)    City    State    Zip      Date of Injury  3/7/2023               Hours Injury  12:00 PM Date Employer Notified  3/9/2023 Last Day of Work after Injury     or Occupational Disease  3/8/2023 Supervisor to Whom Injury     Reported  LOBITO THOMAS   Address or Location of Accident (if applicable)  Work [1]   What were you doing at the time of accident? (if applicable)  LIFTING TIRE    How did this injury or occupational disease occur? (Be specific an answer in detail. Use additional sheet if necessary)  I STARTED FEELING SORE ON TUESDAY, MY BACK STATED TO HURT AND FEEL TENSE AFTER WORKING THE PAST COUPLE OF DAYS. POSSIBLY DUE TO LIFTING AND BENDING OVER REPEATEDLY.   If you believe that you have an occupational disease, when did you first have knowledge of the disability and it relationship to your employment?  N/A Witnesses to the Accident  N/A      Nature of Injury or Occupational Disease  Sprain  Part(s) of Body Injured or Affected  Lower Back Area (Lumbar Area & Lumbo-Sacral), ,     I certify that the above is true and correct to the best " of my knowledge and that I have provided this information in order to obtain the benefits of [place] Chapter 617 of [place]  I hereby authorize any physician, chiropractor, surgeon, practitioner, or other person, any hospital, including [facility], any medical service organization, any insurance company, or other institution or organization to release to each other, any medical or other information, including benefits paid or payable, pertinent to this injury or disease, except information relative to diagnosis, treatment and/or counseling for AIDS, psychological conditions, alcohol or controlled substances, for which I must give specific authorization.  A Photostat of this authorization shall be as valid as the original.     Date   Place Employee’s Original or  *Electronic Signature   THIS REPORT MUST BE COMPLETED AND MAILED WITHIN 3 WORKING DAYS OF TREATMENT   Place  [facility]  Name of Facility  [facility]   Date  3/9/2023 Diagnosis and Description of Injury or Occupational Disease  (S39.012A) Strain of lumbar region, initial encounter  (M62.830) Spasm of muscle of lower back Is there evidence the injured employee was under the influence of alcohol and/or another controlled substance at the time of accident?  ? No ? Yes (if yes, please explain)   Hour  7:08 PM   Diagnoses of Strain of lumbar region, initial encounter and Spasm of muscle of lower back were pertinent to this visit. No   Treatment  Encouraged range of motion, heat, massage, stretching, Tylenol and Motrin as needed for pain.  Will trial muscle relaxant advised not to take while at work and/or driving.  Patient will be placed under great work restrictions.  Follow-up in 1 week for reevaluation.  Have you advised the patient to remain off work five days or     more?    X-Ray Findings      ? Yes Indicate dates:   From   To     "  From information given by the employee, together with medical evidence, can        you directly connect this injury or occupational disease as job incurred?    Comments:Indeterminate ? No If no, is the injured employee capable of:  ? full duty  No ? modified duty  Yes   Is additional medical care by a physician indicated?  Yes If Modified Duty, Specify any Limitations / Restrictions  No bending, squatting, climbing, stooping, pushing, pulling, weight restrictions less than 10 pounds.   Do you know of any previous injury or disease contributing to this condition or occupational disease?  ? Yes ? No (Explain if yes)                          No   Date  3/9/2023 Print Health Care Provider's   AKIL Patino I certify the employer’s copy of  this form was mailed on:   Address  [address] [place] [address] Insurer’s Use Only     [facility] Zip  [address]    Provider’s Tax ID Number  [id number] Telephone  Dept: [phone]             Health Care Provider’s Original or Electronic Signature  e-HORTENSIA Gibbons.P.R.NEdith Degree (MD,DO, DC,PA-C,APRN)  APRN      * Complete and attach Release of Information (Form C-4A) when injured employee signs C-4 Form electronically  ORIGINAL - TREATING HEALTHCARE PROVIDER PAGE 2 - INSURER/TPA PAGE 3 - EMPLOYER PAGE 4 - EMPLOYEE             Form C-4 (rev.08/21)           BRIEF DESCRIPTION OF RIGHTS AND BENEFITS  (Pursuant to [place] 616C.050)    Notice of Injury or Occupational Disease (Incident Report Form C-1): If an injury or occupational disease (OD) arises out of and in the course of employment, you must provide written notice to your employer as soon as practicable, but no later than 7 days after the accident or OD. Your employer shall maintain a sufficient supply of the required forms.    Claim for Compensation (Form C-4): If medical treatment is sought, the form C-4 is available at the place of initial treatment. A completed \"Claim for Compensation\" (Form C-4) must " be filed within 90 days after an accident or OD. The treating physician or chiropractor must, within 3 working days after treatment, complete and mail to the employer, the employer's insurer and third-party , the Claim for Compensation.    Medical Treatment: If you require medical treatment for your on-the-job injury or OD, you may be required to select a physician or chiropractor from a list provided by your workers’ compensation insurer, if it has contracted with an Organization for Managed Care (MCO) or Preferred Provider Organization (PPO) or providers of health care. If your employer has not entered into a contract with an MCO or PPO, you may select a physician or chiropractor from the Panel of Physicians and Chiropractors. Any medical costs related to your industrial injury or OD will be paid by your insurer.    Temporary Total Disability (TTD): If your doctor has certified that you are unable to work for a period of at least 5 consecutive days, or 5 cumulative days in a 20-day period, or places restrictions on you that your employer does not accommodate, you may be entitled to TTD compensation.    Temporary Partial Disability (TPD): If the wage you receive upon reemployment is less than the compensation for TTD to which you are entitled, the insurer may be required to pay you TPD compensation to make up the difference. TPD can only be paid for a maximum of 24 months.    Permanent Partial Disability (PPD): When your medical condition is stable and there is an indication of a PPD as a result of your injury or OD, within 30 days, your insurer must arrange for an evaluation by a rating physician or chiropractor to determine the degree of your PPD. The amount of your PPD award depends on the date of injury, the results of the PPD evaluation, your age and wage.    Permanent Total Disability (PTD): If you are medically certified by a treating physician or chiropractor as permanently and totally disabled  and have been granted a PTD status by your insurer, you are entitled to receive monthly benefits not to exceed 66 2/3% of your average monthly wage. The amount of your PTD payments is subject to reduction if you previously received a lump-sum PPD award.    Vocational Rehabilitation Services: You may be eligible for vocational rehabilitation services if you are unable to return to the job due to a permanent physical impairment or permanent restrictions as a result of your injury or occupational disease.    Transportation and Per Tomasa Reimbursement: You may be eligible for travel expenses and per tomasa associated with medical treatment.    Reopening: You may be able to reopen your claim if your condition worsens after claim closure.     Appeal Process: If you disagree with a written determination issued by the insurer or the insurer does not respond to your request, you may appeal to the Department of Administration, , by following the instructions contained in your determination letter. You must appeal the determination within 70 days from the date of the determination letter at [address], [address], [place] [address], or [address], [place] [address]. If you disagree with the  decision, you may appeal to the Department of Administration, . You must file your appeal within 30 days from the date of the  decision letter at [address], [address], [place], [place] [address], or [address], [place], [place] [address]. If you disagree with a decision of an , you may file a petition for judicial review with the District Court. You must do so within 30 days of the Appeal Officer’s decision. You may be represented by an  at your own expense or you may contact the [facility] for possible representation.    [place]  for [place] Workers ([place]): If you disagree with a   decision, you may request that [facility] represent you without charge at an  Hearing. For information regarding denial of benefits, you may contact the [facility] at: [address], [address], [place] [address], [phone], or [address], [address], [place] [address], [phone]    To File a Complaint with the Division: If you wish to file a complaint with the  of the Division of Industrial Relations (DIR),  please contact the Workers’ Compensation Section, [address], [address], [place], [place] [address], telephone [phone]) [phone], or [address], [address], [place], [place] [address], telephone [phone].    For assistance with Workers’ Compensation Issues: You may contact the [facility] Office for Consumer Health Assistance, [address], [address], [place], [place] [address], Toll Free 1-889.386.1895, Web site: http://[facility].[place].gov/Programs/CONNER E-mail: [email or web address]              __________________________________________________________________                                    _________________            Employee Name / Signature                                                                                                                            Date                                                                                                                                                                                                                              D-2 (rev. 10/20)

## 2023-03-09 NOTE — LETTER
"EMPLOYEE’S CLAIM FOR COMPENSATION/ REPORT OF INITIAL TREATMENT  FORM C-4    EMPLOYEE’S CLAIM - PROVIDE ALL INFORMATION REQUESTED   First Name  Logan Last Name  Isabella Birthdate                    1995                Sex  male Claim Number (Insurer’s Use Only)   Home Address  [address] Age  27 y.o. Height  1.727 m (5' 8\") Weight  68.6 kg (151 lb 3.2 oz) [facility]     [facility] Zip  [address] Telephone  [phone] (home)    Mailing Address  [address] [facility] Zip  [address] Primary Language Spoken  English    Insurer   Third-Party   [employer]   Employee's Occupation (Job Title) When Injury or Occupational Disease Occurred      Employer's Name/Company Name     Telephone      Office Mail Address (Number and Street)    City    State    Zip      Date of Injury  3/7/2023               Hours Injury  12:00 PM Date Employer Notified  3/9/2023 Last Day of Work after Injury     or Occupational Disease  3/8/2023 Supervisor to Whom Injury     Reported  LOBITO THOMAS   Address or Location of Accident (if applicable)  Work [1]   What were you doing at the time of accident? (if applicable)  LIFTING TIRE    How did this injury or occupational disease occur? (Be specific an answer in detail. Use additional sheet if necessary)  I STARTED FEELING SORE ON TUESDAY, MY BACK STATED TO HURT AND FEEL TENSE AFTER WORKING THE PAST COUPLE OF DAYS. POSSIBLY DUE TO LIFTING AND BENDING OVER REPEATEDLY.   If you believe that you have an occupational disease, when did you first have knowledge of the disability and it relationship to your employment?  N/A Witnesses to the Accident  N/A      Nature of Injury or Occupational Disease  Sprain  Part(s) of Body Injured or Affected  Lower Back Area (Lumbar Area & Lumbo-Sacral), ,     I certify that the above is true and correct to the best " of my knowledge and that I have provided this information in order to obtain the benefits of [place]A [place], inclusive or Chapter 617 of [place]RS).  I hereby authorize any physician, chiropractor, surgeon, practitioner, or other person, any hospital, including [facility], any medical service organization, any insurance company, or other institution or organization to release to each other, any medical or other information, including benefits paid or payable, pertinent to this injury or disease, except information relative to diagnosis, treatment and/or counseling for AIDS, psychological conditions, alcohol or controlled substances, for which I must give specific authorization.  A Photostat of this authorization shall be as valid as the original.     Date   Place Employee’s Original or  *Electronic Signature   THIS REPORT MUST BE COMPLETED AND MAILED WITHIN 3 WORKING DAYS OF TREATMENT   Place  [facility]  Name of Facility  [facility]   Date  3/9/2023 Diagnosis and Description of Injury or Occupational Disease  (S39.012A) Strain of lumbar region, initial encounter  (M62.830) Spasm of muscle of lower back Is there evidence the injured employee was under the influence of alcohol and/or another controlled substance at the time of accident?  ? No ? Yes (if yes, please explain)   Hour  7:08 PM   Diagnoses of Strain of lumbar region, initial encounter and Spasm of muscle of lower back were pertinent to this visit. No   Treatment  Encouraged range of motion, heat, massage, stretching, Tylenol and Motrin as needed for pain.  Will trial muscle relaxant advised not to take while at work and/or driving.  Patient will be placed under great work restrictions.  Follow-up in 1 week for reevaluation.  Have you advised the patient to remain off work five days or     more?    X-Ray Findings      ? Yes Indicate dates:   From   To     "  From information given by the employee, together with medical evidence, can        you directly connect this injury or occupational disease as job incurred?  Yes ? No If no, is the injured employee capable of:  ? full duty  No ? modified duty  Yes   Is additional medical care by a physician indicated?  Yes If Modified Duty, Specify any Limitations / Restrictions  No bending, squatting, climbing, stooping, pushing, pulling, weight restrictions less than 10 pounds.   Do you know of any previous injury or disease contributing to this condition or occupational disease?  ? Yes ? No (Explain if yes)                          No   Date  3/9/2023 Print Health Care Provider's   AKIL Patino I certify the employer’s copy of  this form was mailed on:   Address  [address] [place] [address] [address] Insurer’s Use Only     [facility] Zip  [address]    Provider’s Tax ID Number  [id number] Telephone  Dept: [phone]             Health Care Provider’s Original or Electronic Signature  e-HORTENSIA Gibbons.REdithNEdith Degree (MD,DO, DC,PA-C,APRN)  APRN      * Complete and attach Release of Information (Form C-4A) when injured employee signs C-4 Form electronically  ORIGINAL - TREATING HEALTHCARE PROVIDER PAGE 2 - INSURER/TPA PAGE 3 - EMPLOYER PAGE 4 - EMPLOYEE             Form C-4 (rev.08/21)           BRIEF DESCRIPTION OF RIGHTS AND BENEFITS  ([place]ursuant to [place] 616C.050)    Notice of Injury or Occupational Disease (Incident Report Form C-1): If an injury or occupational disease (OD) arises out of and in the course of employment, you must provide written notice to your employer as soon as practicable, but no later than 7 days after the accident or OD. Your employer shall maintain a sufficient supply of the required forms.    Claim for Compensation (Form C-4): If medical treatment is sought, the form C-4 is available at the place of initial treatment. A completed \"Claim for Compensation\" (Form C-4) must be filed within 90 " days after an accident or OD. The treating physician or chiropractor must, within 3 working days after treatment, complete and mail to the employer, the employer's insurer and third-party , the Claim for Compensation.    Medical Treatment: If you require medical treatment for your on-the-job injury or OD, you may be required to select a physician or chiropractor from a list provided by your workers’ compensation insurer, if it has contracted with an Organization for Managed Care (MCO) or Preferred Provider Organization (PPO) or providers of health care. If your employer has not entered into a contract with an MCO or PPO, you may select a physician or chiropractor from the Panel of Physicians and Chiropractors. Any medical costs related to your industrial injury or OD will be paid by your insurer.    Temporary Total Disability (TTD): If your doctor has certified that you are unable to work for a period of at least 5 consecutive days, or 5 cumulative days in a 20-day period, or places restrictions on you that your employer does not accommodate, you may be entitled to TTD compensation.    Temporary Partial Disability (TPD): If the wage you receive upon reemployment is less than the compensation for TTD to which you are entitled, the insurer may be required to pay you TPD compensation to make up the difference. TPD can only be paid for a maximum of 24 months.    Permanent Partial Disability (PPD): When your medical condition is stable and there is an indication of a PPD as a result of your injury or OD, within 30 days, your insurer must arrange for an evaluation by a rating physician or chiropractor to determine the degree of your PPD. The amount of your PPD award depends on the date of injury, the results of the PPD evaluation, your age and wage.    Permanent Total Disability (PTD): If you are medically certified by a treating physician or chiropractor as permanently and totally disabled and have been  granted a PTD status by your insurer, you are entitled to receive monthly benefits not to exceed 66 2/3% of your average monthly wage. The amount of your PTD payments is subject to reduction if you previously received a lump-sum PPD award.    Vocational Rehabilitation Services: You may be eligible for vocational rehabilitation services if you are unable to return to the job due to a permanent physical impairment or permanent restrictions as a result of your injury or occupational disease.    Transportation and Per Tomasa Reimbursement: You may be eligible for travel expenses and per tomasa associated with medical treatment.    Reopening: You may be able to reopen your claim if your condition worsens after claim closure.     Appeal Process: If you disagree with a written determination issued by the insurer or the insurer does not respond to your request, you may appeal to the Department of Administration, , by following the instructions contained in your determination letter. You must appeal the determination within 70 days from the date of the determination letter at [address], [address], [place], [place] [address], or [address], [place] [address]. If you disagree with the  decision, you may appeal to the Department of Administration, . You must file your appeal within 30 days from the date of the  decision letter at [address], [address], [place], [place] [address], or [address], [place], [place] [address]. If you disagree with a decision of an , you may file a petition for judicial review with the District Court. You must do so within 30 days of the Appeal Officer’s decision. You may be represented by an  at your own expense or you may contact the [facility] for possible representation.    [place]  for [place] ([place]): If you disagree with a   decision, you may request that [facility] represent you without charge at an  Hearing. For information regarding denial of benefits, you may contact the [facility] at: [address], [address], [place] [address], [phone], or [address], [place] [address], [phone]    To File a Complaint with the Division: If you wish to file a complaint with the  of the Division of Industrial Relations (DIR),  please contact the Workers’ Compensation Section, [address], [address], [place], [place] [address], telephone (610) [phone], or [address], [address], [place], [place] [address], telephone [phone].    For assistance with Workers’ Compensation Issues: You may contact the [facility] Office for Consumer Health Assistance, [address], [address], [place], [place] [address], Toll Free 1-569.586.7990, Web site: http://[facility].[place].gov/Programs/CONNER E-mail: [email or web address]              __________________________________________________________________                                    _________________            Employee Name / Signature                                                                                                                            Date                                                                                                                                                                                                                              D-2 (rev. 10/20)

## 2023-03-09 NOTE — LETTER
[facility] [place] [id number] [facility] [address] [address]  Phone:  [phone] - Fax:  [phone]   [facility] Progress Report and Disability Certification  Date of Service: 3/9/2023   No Show:  No  Date / Time of Next Visit: 3/16/2023   Claim Information   Patient Name: Logan Mason  Claim Number:     Employer:    Date of Injury: 3/7/2023     Insurer / TPA: [employer]  ID / SSN:     Occupation:   Diagnosis: Diagnoses of Strain of lumbar region, initial encounter and Spasm of muscle of lower back were pertinent to this visit.    Medical Information   Related to Industrial Injury?   Comments:Indeterminate    Subjective Complaints:  DOI 3/7/23: Patient is a 27-year-old male presents urgent care for evaluation of low back pain that has developed and worsened over the past few days.  Patient uncertain exact mechanism of injury however woke up with discomfort in his low back and when he returned back to work it was exacerbated with the repetitive bending and heavy lifting that he does not work.  He denies any numbness or tingling in bilateral lower extremities.  He has decreased range of motion due to stiffness in the low back.  He denies any loss of bowel or bladder.  He has tried taking Tylenol Motrin with minimal relief in symptoms.  He has had low back pain in the past however is not chronic and/or persistent.  He denies second job.   Objective Findings: Spine: No midline tenderness, step-off, deformit.  Paraspinal muscle spasms elicited on the lumbar region. +DROM due to pain.  DTRs +2 bilaterally.  Straight leg negative bilaterally, sensation intact distally, neurovascular intact.  Gait normal.   Pre-Existing Condition(s):     Assessment:   Initial Visit    Status: Additional Care Required  Permanent Disability:No    Plan: Medication (NOT at Work)Medication    Diagnostics:      Comments:       Disability Information   Status: Released to  Restricted Duty    From:  3/9/2023  Through: 3/16/2023 Restrictions are: Temporary   Physical Restrictions   Sitting:    Standing:    Stoopin hrs/day Bendin hrs/day   Squattin hrs/day Walking:    Climbin hrs/day Pushin hrs/day   Pullin hrs/day Other:    Reaching Above Shoulder (L):   Reaching Above Shoulder (R):       Reaching Below Shoulder (L):    Reaching Below Shoulder (R):      Not to exceed Weight Limits   Carrying(hrs):   Weight Limit(lb): < or = to 10 pounds Lifting(hrs):   Weight  Limit(lb): < or = to 10 pounds   Comments: Encouraged range of motion, heat, massage, stretching, Tylenol and Motrin as needed for pain.  Will trial muscle relaxant advised not to take while at work and/or driving.  Patient will be placed under great work restrictions.  Follow-up in 1 week for reevaluation.    Repetitive Actions   Hands: i.e. Fine Manipulations from Grasping:     Feet: i.e. Operating Foot Controls:     Driving / Operate Machinery:     Health Care Provider’s Original or Electronic Signature  AKIL Patino Health Care Provider’s Original or Electronic Signature    Charles Scanlon DO MPH     Clinic Name / Location: [place] [address]  [address] [address] Clinic Phone Number: Dept: [phone]   Appointment Time: 6:45 Pm Visit Start Time: 7:08 PM   Check-In Time:  6:46 Pm Visit Discharge Time:     Original-Treating Physician or Chiropractor    Page 2-Insurer/TPA    Page 3-Employer    Page 4-Employee

## 2023-03-10 NOTE — PROGRESS NOTES
"Subjective:   Logan Mason  is a 27 y.o. male who presents for Back Pain (Low back pain)    DOI 3/7/23: Patient is a 27-year-old male presents urgent care for evaluation of low back pain that has developed and worsened over the past few days.  Patient uncertain exact mechanism of injury however woke up with discomfort in his low back and when he returned back to work it was exacerbated with the repetitive bending and heavy lifting that he does not work.  He denies any numbness or tingling in bilateral lower extremities.  He has decreased range of motion due to stiffness in the low back.  He denies any loss of bowel or bladder.  He has tried taking Tylenol Motrin with minimal relief in symptoms.  He has had low back pain in the past however is not chronic and/or persistent.  He denies second job.   HPI  Review of Systems   Constitutional:  Negative for chills and fever.   HENT:  Negative for sore throat.    Eyes:  Negative for redness.   Respiratory:  Negative for shortness of breath.    Cardiovascular:  Negative for chest pain.   Gastrointestinal:  Negative for nausea and vomiting.   Genitourinary:  Negative for dysuria.   Musculoskeletal:  Positive for back pain. Negative for myalgias.   Skin:  Negative for rash.   Neurological:  Negative for dizziness.   No Known Allergies   Objective:   BP (!) 130/90 (BP Location: Left arm, Patient Position: Sitting, BP Cuff Size: Adult)   Pulse 86   Temp 36.5 °C (97.7 °F) (Temporal)   Resp 16   Ht 1.727 m (5' 8\")   Wt 68.6 kg (151 lb 3.2 oz)   SpO2 96%   BMI 22.99 kg/m²   Physical Exam  Constitutional:       Appearance: Normal appearance. He is not ill-appearing or toxic-appearing.   HENT:      Head: Normocephalic.      Right Ear: External ear normal.      Left Ear: External ear normal.      Nose: Nose normal.      Mouth/Throat:      Lips: Pink.      Mouth: Mucous membranes are moist.   Eyes:      General: Lids are normal.         Right eye: No discharge.         Left " eye: No discharge.   Pulmonary:      Effort: Pulmonary effort is normal. No accessory muscle usage or respiratory distress.   Musculoskeletal:         General: Normal range of motion.      Cervical back: Normal and full passive range of motion without pain.      Thoracic back: Normal.      Lumbar back: Spasms and tenderness present. No swelling. Normal range of motion. Negative right straight leg raise test and negative left straight leg raise test.   Skin:     Coloration: Skin is not pale.   Neurological:      Mental Status: He is alert and oriented to person, place, and time.   Psychiatric:         Mood and Affect: Mood normal.         Thought Content: Thought content normal.     Spine: No midline tenderness, step-off, deformit.  Paraspinal muscle spasms elicited on the lumbar region. +DROM due to pain.  DTRs +2 bilaterally.  Straight leg negative bilaterally, sensation intact distally, neurovascular intact.  Gait normal.   Assessment/Plan:   1. Strain of lumbar region, initial encounter  - cyclobenzaprine (FLEXERIL) 5 mg tablet; Take 1-2 Tablets by mouth 3 times a day as needed for Moderate Pain or Muscle Spasms.  Dispense: 30 Tablet; Refill: 0    2. Spasm of muscle of lower back  - cyclobenzaprine (FLEXERIL) 5 mg tablet; Take 1-2 Tablets by mouth 3 times a day as needed for Moderate Pain or Muscle Spasms.  Dispense: 30 Tablet; Refill: 0  Encouraged range of motion, heat, massage, stretching, Tylenol and Motrin as needed for pain.  Will trial muscle relaxant advised not to take while at work and/or driving.  Patient will be placed under great work restrictions.  Follow-up in 1 week for reevaluation.  Differential diagnosis, natural history, supportive care, and indications for immediate follow-up discussed.       My total time spent caring for the patient on the day of the encounter was 32 minutes.   This does not include time spent on separately billable procedures/tests.

## 2023-03-16 ENCOUNTER — OCCUPATIONAL MEDICINE (OUTPATIENT)
Dept: URGENT CARE | Facility: CLINIC | Age: 28
End: 2023-03-16
Payer: COMMERCIAL

## 2023-03-16 VITALS
SYSTOLIC BLOOD PRESSURE: 128 MMHG | HEIGHT: 68 IN | RESPIRATION RATE: 16 BRPM | TEMPERATURE: 98.8 F | BODY MASS INDEX: 23.04 KG/M2 | WEIGHT: 152 LBS | HEART RATE: 102 BPM | DIASTOLIC BLOOD PRESSURE: 70 MMHG | OXYGEN SATURATION: 98 %

## 2023-03-16 DIAGNOSIS — S39.012D STRAIN OF LUMBAR REGION, SUBSEQUENT ENCOUNTER: ICD-10-CM

## 2023-03-16 PROCEDURE — 99213 OFFICE O/P EST LOW 20 MIN: CPT | Performed by: PHYSICIAN ASSISTANT

## 2023-03-16 RX ORDER — KETOROLAC TROMETHAMINE 30 MG/ML
30 INJECTION, SOLUTION INTRAMUSCULAR; INTRAVENOUS ONCE
Status: COMPLETED | OUTPATIENT
Start: 2023-03-16 | End: 2023-03-16

## 2023-03-16 RX ORDER — METHYLPREDNISOLONE 4 MG/1
TABLET ORAL
Qty: 1 EACH | Refills: 0 | Status: SHIPPED | OUTPATIENT
Start: 2023-03-16 | End: 2023-03-28

## 2023-03-16 RX ORDER — IBUPROFEN 200 MG
200 TABLET ORAL EVERY 6 HOURS PRN
COMMUNITY
End: 2023-03-28

## 2023-03-16 RX ADMIN — KETOROLAC TROMETHAMINE 30 MG: 30 INJECTION, SOLUTION INTRAMUSCULAR; INTRAVENOUS at 11:26

## 2023-03-16 NOTE — LETTER
[facility] [place] [id number] [facility] [address] [address]  Phone:  [phone] - Fax:  [phone]   [facility] Progress Report and Disability Certification  Date of Service: 3/16/2023   No Show:  No  Date / Time of Next Visit: 3/23/2023 @ 10:45am   Claim Information   Patient Name: Logan Mason  Claim Number:     Employer:   [employer]   Date of Injury: 3/7/2023     Insurer / TPA: [employer]  ID / SSN:     Occupation:   Diagnosis: The encounter diagnosis was Strain of lumbar region, subsequent encounter.    Medical Information   Related to Industrial Injury? Yes    Subjective Complaints:  DOI: 3/7/23 - Pt RTC for 2nd  evaluation while treating lumbar strain.  Patient denies specific moment of injury but attributed low back pain to repetitive bending and lifting while at work.  Patient was treated with OTC pain medications and muscle relaxant as well as work restrictions.  Returns to clinic for second workers comp evaluation stating: He has had continued low back pain right greater than left.  He reports minimal change in back pain over interim.  Muscle relaxant can help but has left him feeling drowsy in the morning.  Has been using OTC ibuprofen.  Denies numbness tingling or weakness.  Denies saddle anesthesia or incontinence.  Denies history of back surgeries.  Denies other current employment.   Objective Findings: Gen: AOx3; Head: NC AT; Eyes: PERRLA/EOM; Lungs: NLR; Cardiac: RR by periph pulse exam; Lumbar: Grossly normal no erythema ecchymosis or edema, no bony abnormalities or tenderness to palpation, primarily paraspinal musculature tenderness right side greater than left, hypertonic musculature through this area, limited active range of motion secondary to pain; neuro: NVID, normal sensation light touch, normal resisted lower extremity strength testing bilaterally, DTRs +2 and equal, slow and steady gait   Pre-Existing Condition(s):      Assessment:   Condition Same    Status: Additional Care Required  Permanent Disability:No    Plan:   Comments:Continued work restrictions, Rx Medrol to pharmacy, Toradol in clinic, focus on heat stretching and ice, OTC Tylenol as needed, decrease Flexeril dose as needed, follow-up in 1 week    Diagnostics:      Comments:       Disability Information   Status: Released to Restricted Duty    From:  3/16/2023  Through: 3/23/2023 Restrictions are: Temporary   Physical Restrictions   Sitting:    Standing:  < or = to 1 hr/day Stooping:  < or = to 1 hr/day Bending:  < or = to 1 hr/day   Squatting:    Walking:    Climbing:  < or = to 1 hr/day Pushing:      Pulling:    Other:    Reaching Above Shoulder (L):   Reaching Above Shoulder (R):       Reaching Below Shoulder (L):    Reaching Below Shoulder (R):      Not to exceed Weight Limits   Carrying(hrs):   Weight Limit(lb): < or = to 10 pounds Lifting(hrs):   Weight  Limit(lb): < or = to 10 pounds   Comments: Continued work restrictions, Rx Medrol to pharmacy, Toradol in clinic, focus on heat stretching and ice, OTC Tylenol as needed, decrease Flexeril dose as needed, follow-up in 1 week      Repetitive Actions   Hands: i.e. Fine Manipulations from Grasping:     Feet: i.e. Operating Foot Controls:     Driving / Operate Machinery:     Health Care Provider’s Original or Electronic Signature  Mode Perkins P.A.-C. Health Care Provider’s Original or Electronic Signature    Charles Scanlon DO MPH     Clinic Name / Location: [place] [address]  [place] [address] Clinic Phone Number: Dept: [phone]   Appointment Time: 10:30 Am Visit Start Time: 10:46 AM   Check-In Time:  10:33 Am Visit Discharge Time: 11:32 AM    Original-Treating Physician or Chiropractor    Page 2-Insurer/TPA    Page 3-Employer    Page 4-Employee

## 2023-03-16 NOTE — PROGRESS NOTES
"Subjective:     Logan Mason is a 27 y.o. male who presents for Other (WC [facility] DOI : 03/07/23 back )      DOI: 3/7/23 - Pt RTC for 2nd  evaluation while treating lumbar strain.  Patient denies specific moment of injury but attributed low back pain to repetitive bending and lifting while at work.  Patient was treated with OTC pain medications and muscle relaxant as well as work restrictions.  Returns to clinic for second workers comp evaluation stating: He has had continued low back pain right greater than left.  He reports minimal change in back pain over interim.  Muscle relaxant can help but has left him feeling drowsy in the morning.  Has been using OTC ibuprofen.  Denies numbness tingling or weakness.  Denies saddle anesthesia or incontinence.  Denies history of back surgeries.  Denies other current employment.    PMH:   No pertinent past medical history to this problem  MEDS:  Medications were reviewed in EMR  ALLERGIES:  Allergies were reviewed in EMR  FH:   No pertinent family history to this problem       Objective:     /70   Pulse (!) 102   Temp 37.1 °C (98.8 °F)   Resp 16   Ht 1.727 m (5' 8\")   Wt 68.9 kg (152 lb)   SpO2 98%   BMI 23.11 kg/m²     Gen: AOx3; Head: NC AT; Eyes: PERRLA/EOM; Lungs: NLR; Cardiac: RR by periph pulse exam; Lumbar: Grossly normal no erythema ecchymosis or edema, no bony abnormalities or tenderness to palpation, primarily paraspinal musculature tenderness right side greater than left, hypertonic musculature through this area, limited active range of motion secondary to pain; neuro: NVID, normal sensation light touch, normal resisted lower extremity strength testing bilaterally, DTRs +2 and equal, slow and steady gait    Toradol 30 mg IM-tolerates well    Assessment/Plan:       1. Strain of lumbar region, subsequent encounter  - ketorolac (TORADOL) injection 30 mg  - methylPREDNISolone (MEDROL DOSEPAK) 4 MG Tablet Therapy Pack; Take as directed on package.  Dispense " one package.  Dispense: 1 Each; Refill: 0    Other orders  - ibuprofen (MOTRIN) 200 MG Tab; Take 200 mg by mouth every 6 hours as needed.    Released to Restricted Duty FROM 3/16/2023 TO 3/23/2023  Continued work restrictions, Rx Medrol to pharmacy, Toradol in clinic, focus on heat stretching and ice, OTC Tylenol as needed, decrease Flexeril dose as needed, follow-up in 1 week         Differential diagnosis, natural history, supportive care, and indications for immediate follow-up discussed.

## 2023-03-23 ENCOUNTER — OCCUPATIONAL MEDICINE (OUTPATIENT)
Dept: URGENT CARE | Facility: CLINIC | Age: 28
End: 2023-03-23
Payer: COMMERCIAL

## 2023-03-23 VITALS
TEMPERATURE: 97.8 F | OXYGEN SATURATION: 97 % | DIASTOLIC BLOOD PRESSURE: 70 MMHG | SYSTOLIC BLOOD PRESSURE: 122 MMHG | WEIGHT: 152.3 LBS | HEART RATE: 94 BPM | RESPIRATION RATE: 16 BRPM | HEIGHT: 68 IN | BODY MASS INDEX: 23.08 KG/M2

## 2023-03-23 DIAGNOSIS — S39.012D STRAIN OF LUMBAR REGION, SUBSEQUENT ENCOUNTER: ICD-10-CM

## 2023-03-23 PROCEDURE — 99214 OFFICE O/P EST MOD 30 MIN: CPT | Performed by: PHYSICIAN ASSISTANT

## 2023-03-23 NOTE — LETTER
[facility] [place] [address] [facility] [address] [address]  Phone:  [phone] - Fax:  [phone]   [facility] Progress Report and Disability Certification  Date of Service: 3/23/2023   No Show:  No  Date / Time of Next Visit: 3/31/2023   Claim Information   Patient Name: Logan Mason  Claim Number:     Employer:   [employer] Date of Injury: 3/7/2023     Insurer / TPA: [employer]  ID / SSN:     Occupation:   Diagnosis: The encounter diagnosis was Strain of lumbar region, subsequent encounter.    Medical Information   Related to Industrial Injury? Yes    Subjective Complaints:  DOI: 3/7/23 - Pt notes pain to back that began after repetitive lifting at work.  Patient was treated with Toradol and Medrol last week.  He returns to clinic stating: He reports significant improvement with medications used last week as well as heat and stretching.  Patient approximately 70-80% improved at this time.  Still notes some pain to low back.  Denies history of back surgeries.  Denies radiation of back pain.  Denies numbness tingling or weakness.  Patient reports that he heard from his employer that his Worker's Comp. case was being challenged and they sent him FMLA paperwork.  He does have a primary care.   Objective Findings: Gen: AOx3; Head: NC AT; Eyes: PERRLA/EOM; Lungs: NLR; Cardiac: RR by periph pulse exam; Lumbar: Grossly normal no erythema ecchymosis or edema, full active range of motion, tenderness to palpation left greater than right paraspinal musculature with palpably hypertonic muscle, no bony abnormalities or step-off; neuro: NVID, normal sensation light touch, nonantalgic gait, normal lower extremity strength testing, straight leg raise normal bilaterally   Pre-Existing Condition(s):     Assessment:   Condition Improved    Status: Additional Care Required  Permanent Disability:No    Plan:   Comments:Restricted duty, 25 pound weight restriction,  allow for breaks every 3 hours at work, OTC NSAIDs, heat/ice, stretching, follow-up in 1 week we will place occupational medicine referral    Diagnostics:      Comments:  Restricted duty, 25 pound weight restriction, allow for breaks every 3 hours at work, OTC NSAIDs, heat/ice, stretching, follow-up in 1 week we will place occupational medicine referral      Disability Information   Status: Released to Restricted Duty    From:  3/23/2023  Through: 3/31/2023 Restrictions are: Temporary   Physical Restrictions   Sitting:    Standing:    Stooping:    Bending:      Squatting:    Walking:    Climbing:    Pushing:      Pulling:    Other:    Reaching Above Shoulder (L):   Reaching Above Shoulder (R):       Reaching Below Shoulder (L):    Reaching Below Shoulder (R):      Not to exceed Weight Limits   Carrying(hrs):   Weight Limit(lb): < or = to 25 pounds Lifting(hrs):   Weight  Limit(lb): < or = to 25 pounds   Comments: Restricted duty, 25 pound weight restriction, allow for breaks every 3 hours at work, OTC NSAIDs, heat/ice, stretching, follow-up in 1 week we will place occupational medicine referral      Repetitive Actions   Hands: i.e. Fine Manipulations from Grasping:     Feet: i.e. Operating Foot Controls:     Driving / Operate Machinery:     Health Care Provider’s Original or Electronic Signature  Mode Perkins P.A.-C. Health Care Provider’s Original or Electronic Signature    Charles Scanlon DO MPH     Clinic Name / Location: [place] [address]  [place] [address] Clinic Phone Number: Dept: [phone]   Appointment Time: 10:45 Am Visit Start Time: 11:09 AM   Check-In Time:  10:45 Am Visit Discharge Time:     Original-Treating Physician or Chiropractor    Page 2-Insurer/TPA    Page 3-Employer    Page 4-Employee

## 2023-03-23 NOTE — PROGRESS NOTES
"Subjective:     Logan Mason is a 27 y.o. male who presents for Follow-Up (WC FV DOI: 3/7/23 Lower back feels a little better, slight soreness )      DOI: 3/7/23 - Pt notes pain to back that began after repetitive lifting at work.  Patient was treated with Toradol and Medrol last week.  He returns to clinic stating: He reports significant improvement with medications used last week as well as heat and stretching.  Patient approximately 70-80% improved at this time.  Still notes some pain to low back.  Denies history of back surgeries.  Denies radiation of back pain.  Denies numbness tingling or weakness.  Patient reports that he heard from his employer that his Worker's Comp. case was being challenged and they sent him FMLA paperwork.  He does have a primary care.    PMH:   No pertinent past medical history to this problem  MEDS:  Medications were reviewed in EMR  ALLERGIES:  Allergies were reviewed in EMR  FH:   No pertinent family history to this problem       Objective:     /70 (BP Location: Left arm, Patient Position: Sitting, BP Cuff Size: Adult)   Pulse 94   Temp 36.6 °C (97.8 °F) (Temporal)   Resp 16   Ht 1.727 m (5' 8\")   Wt 69.1 kg (152 lb 4.8 oz)   SpO2 97%   BMI 23.16 kg/m²     Gen: AOx3; Head: NC AT; Eyes: PERRLA/EOM; Lungs: NLR; Cardiac: RR by periph pulse exam; Lumbar: Grossly normal no erythema ecchymosis or edema, full active range of motion, tenderness to palpation left greater than right paraspinal musculature with palpably hypertonic muscle, no bony abnormalities or step-off; neuro: NVID, normal sensation light touch, nonantalgic gait, normal lower extremity strength testing, straight leg raise normal bilaterally    Assessment/Plan:       1. Strain of lumbar region, subsequent encounter  - Referral to Occupational Medicine    Released to Restricted Duty FROM 3/23/2023 TO 3/31/2023  Restricted duty, 25 pound weight restriction, allow for breaks every 3 hours at work, OTC NSAIDs, " heat/ice, stretching, follow-up in 1 week we will place occupational medicine referral    Restricted duty, 25 pound weight restriction, allow for breaks every 3 hours at work, OTC NSAIDs, heat/ice, stretching, follow-up in 1 week we will place occupational medicine referral      Differential diagnosis, natural history, supportive care, and indications for immediate follow-up discussed.    My total time spent caring for the patient on the day of the encounter was 33 minutes.   This does not include time spent on separately billable procedures/tests.

## 2023-03-28 ENCOUNTER — OFFICE VISIT (OUTPATIENT)
Dept: MEDICAL GROUP | Facility: MEDICAL CENTER | Age: 28
End: 2023-03-28
Attending: FAMILY MEDICINE
Payer: MEDICAID

## 2023-03-28 VITALS
RESPIRATION RATE: 16 BRPM | OXYGEN SATURATION: 95 % | DIASTOLIC BLOOD PRESSURE: 68 MMHG | WEIGHT: 151 LBS | HEART RATE: 88 BPM | TEMPERATURE: 97.9 F | BODY MASS INDEX: 22.88 KG/M2 | HEIGHT: 68 IN | SYSTOLIC BLOOD PRESSURE: 110 MMHG

## 2023-03-28 DIAGNOSIS — S39.012D STRAIN OF LUMBAR REGION, SUBSEQUENT ENCOUNTER: ICD-10-CM

## 2023-03-28 PROBLEM — N47.6 BALANOPOSTHITIS: Status: RESOLVED | Noted: 2022-06-13 | Resolved: 2023-03-28

## 2023-03-28 PROBLEM — S39.012A LUMBAR SPINE STRAIN: Status: ACTIVE | Noted: 2023-03-28

## 2023-03-28 PROCEDURE — 99213 OFFICE O/P EST LOW 20 MIN: CPT | Performed by: FAMILY MEDICINE

## 2023-03-28 PROCEDURE — 99212 OFFICE O/P EST SF 10 MIN: CPT | Performed by: FAMILY MEDICINE

## 2023-03-28 NOTE — ASSESSMENT & PLAN NOTE
Logan presents today for completion of [facility] paperwork due to work related injury leading to low back pain. Reports that on 3/7/23 sustained initial injury to his low back while lifting heavy tires which was exacerbated the following days. He has been on leave since 3/9/23 and then was placed on restricted duty by 3/16/23. He was recommended work accommodations, but employer apparently is only allowing him to return when he has no restriction. He had been seeing Occupational Health for this previously. Reports continued muscle spasm of his lower back, worse with forward flexion. Has tried heat therapy, ice pack, home exercise program, steroids, and Flexeril for relief. He is hoping to return back to work, but is concerned about possible heavy lifting worsening his low back pain. Has follow up appointment with Occupational Health in 3 days.

## 2023-03-28 NOTE — PROGRESS NOTES
"Subjective   Chief Complaint   Patient presents with    Follow-Up     paperwork        HPI:  Lumbar spine strain  Logan presents today for completion of FMLA paperwork due to work related injury leading to low back pain. Reports that on 3/7/23 sustained initial injury to his low back while lifting heavy tires which was exacerbated the following days. He has been on leave since 3/9/23 and then was placed on restricted duty by 3/16/23. He was recommended work accommodations, but employer apparently is only allowing him to return when he has no restriction. He had been seeing Occupational Health for this previously. Reports continued muscle spasm of his lower back, worse with forward flexion. Has tried heat therapy, ice pack, home exercise program, steroids, and Flexeril for relief. He is hoping to return back to work, but is concerned about possible heavy lifting worsening his low back pain. Has follow up appointment with Occupational Health in 3 days.      Objective   Social History     Tobacco Use    Smoking status: Never     Passive exposure: Never    Smokeless tobacco: Never   Vaping Use    Vaping Use: Some days    Substances: THC    Devices: Pre-filled or refillable cartridge   Substance Use Topics    Alcohol use: Yes     Comment: rare occassions    Drug use: Yes     Frequency: 7.0 times per week     Types: Marijuana, Inhaled     Comment: daily since age [age]       Exam:  /68   Pulse 88   Temp 36.6 °C (97.9 °F)   Resp 16   Ht 1.727 m (5' 7.99\")   Wt 68.5 kg (151 lb)   SpO2 95%   BMI 22.96 kg/m²     Physical Exam  Constitutional: Alert, no distress  (A) Inspection: Easy transition from seated to standing. No swelling, deformities, or erythema. No obvious scoliosis or stepoffs.  (B) ROM: Normal flexion, extension, rotation, and lateral bending   (C) Palpation: No TTP of spinous processes (cervical to sacral). No TTP of paravertebral area or soft tissues. +spasm of bilateral L5 area  (D) Neurovascular: " BL patellar DTRs 2+. Babinski NML. LE sensation intact (multiple dermatomes). Normal gait. No Known Allergies    [id number] - [place], [place] - [address]  [address]  [place] [address]  Phone: [phone] Fax: [phone]    No current outpatient medications on file.     No current facility-administered medications for this visit.       Assessment & Plan    27 y.o. male with the following -     1. Strain of lumbar region, subsequent encounter  - Patient has been following with Occupational Health for this injury since 3/9/23  - Appears that employer is challenging workman's comp case and is requesting paperwork from PCP; form completed for patient to best of my ability per patient report as well as Occupational Health notes  - Clinically improving, however noted to have hypertonicity of lumbar area and would benefit from formal physical therapy in addition to home exercise program.  - Patient will discuss further at his next appointment with [facility]  - Continue with conservative management with   - Ice/heat alternating with Motrin   - Gentle stretching as tolerated and to avoid bed rest.  - Avoid heavy lifting until symptoms have improved; weight restriction to 25 lbs with frequent breaks up to 3 hours/day   - Allow 4-6 weeks before complete recovery.   -Handout provided with stretching and strengthening techniques  [x]Pt verbalized understanding and acknowledged treatment plan.      Return if symptoms worsen or fail to improve.    Please note that this dictation was created using voice recognition software. I have made every reasonable attempt to correct obvious errors, but I expect that there are errors of grammar and possibly content that I did not discover before finalizing the note.

## 2023-03-31 ENCOUNTER — OCCUPATIONAL MEDICINE (OUTPATIENT)
Dept: URGENT CARE | Facility: CLINIC | Age: 28
End: 2023-03-31
Payer: MEDICAID

## 2023-03-31 VITALS
DIASTOLIC BLOOD PRESSURE: 80 MMHG | OXYGEN SATURATION: 99 % | TEMPERATURE: 98.1 F | RESPIRATION RATE: 16 BRPM | WEIGHT: 154.5 LBS | BODY MASS INDEX: 23.42 KG/M2 | HEART RATE: 88 BPM | HEIGHT: 68 IN | SYSTOLIC BLOOD PRESSURE: 120 MMHG

## 2023-03-31 DIAGNOSIS — S39.012D STRAIN OF LUMBAR REGION, SUBSEQUENT ENCOUNTER: ICD-10-CM

## 2023-03-31 PROCEDURE — 99213 OFFICE O/P EST LOW 20 MIN: CPT

## 2023-03-31 NOTE — PROGRESS NOTES
"Subjective:   Logan Mason is a 27 y.o. male who presents for Back Pain ([facility] DOI lower back injury.  The patient stated he's about 80% better, but still have tenderness and a bit of pain around the spine.)      HPI:  Original HPI COPIED from 3/9/23:DOI 3/7/23: Patient is a 27-year-old male presents urgent care for evaluation of low back pain that has developed and worsened over the past few days.  Patient uncertain exact mechanism of injury however woke up with discomfort in his low back and when he returned back to work it was exacerbated with the repetitive bending and heavy lifting that he does not work.  He denies any numbness or tingling in bilateral lower extremities.  He has decreased range of motion due to stiffness in the low back.  He denies any loss of bowel or bladder.  He has tried taking Tylenol Motrin with minimal relief in symptoms.  He has had low back pain in the past however is not chronic and/or persistent.  He denies second job.     3/31/23 Fourth visit: Patient reports he has not been to work secondary to work restrictions.  He reports some improvement to his back.  He reports that he is about 85% improved.  He reports continued pain with bending to mid back.  He reports using stretches which has been helpful.  Patient recently had [facility] paperwork filed with primary care.  Patient denies radiating pain.  No numbness or tingling.  No weakness.      ROS per HPI        Problem list, medications, and allergies reviewed by myself today in Epic.     Objective:     /80 (BP Location: Left arm, Patient Position: Sitting, BP Cuff Size: Adult)   Pulse 88   Temp 36.7 °C (98.1 °F) (Temporal)   Resp 16   Ht 1.727 m (5' 8\")   Wt 70.1 kg (154 lb 8 oz)   SpO2 99%   BMI 23.49 kg/m²     Physical Exam  Vitals and nursing note reviewed.   Constitutional:       General: He is not in acute distress.     Appearance: Normal appearance. He is normal weight. He is not ill-appearing or toxic-appearing. "   HENT:      Head: Normocephalic and atraumatic.   Musculoskeletal:      Comments: Low back: + TTP to paraspinal muscles.  No obvious deformity, no erythema, no swelling, no step-offs, no midline tenderness.  No antalgic gait. 5/5 to lower extremities.    Skin:     General: Skin is warm and dry.      Capillary Refill: Capillary refill takes less than 2 seconds.   Neurological:      Mental Status: He is alert.      Gait: Gait normal.       Assessment/Plan:     Diagnosis and associated orders:   1. Strain of lumbar region, subsequent encounter               Comments/MDM:   Pt is clinically stable at today's acute urgent care visit.  No acute distress noted. Appropriate for outpatient management at this time.     Acute problem.   Work restrictions per D[employer]  Continue stretches  Alternate Tylenol ibuprofen as needed for pain  Follow-up with the occupational medicine         Discussed DDx, management options (risks,benefits, and alternatives to planned treatment), natural progression and supportive care.  Expressed understanding and the treatment plan was agreed upon. Questions were encouraged and answered   Return to urgent care prn if new or worsening sx or if there is no improvement in condition prn.    Educated in Red flags and indications to immediately call 911 or present to the Emergency Department.   Advised the patient to follow-up with the primary care physician for recheck, reevaluation, and consideration of further management.    I personally reviewed prior external notes and test results pertinent to today's visit.  I have independently reviewed and interpreted all diagnostics ordered during this urgent care acute visit.       Please note that this dictation was created using voice recognition software. I have made a reasonable attempt to correct obvious errors, but I expect that there are errors of grammar and possibly content that I did not discover before finalizing the note.    This note was electronically  signed by JEREL Dunham

## 2023-03-31 NOTE — LETTER
[facility] [place] [address] [place] [address]  Phone:  [phone] - Fax:  [phone]   [facility] Progress Report and Disability Certification  Date of Service: 3/31/2023   No Show:  No  Date / Time of Next Visit:     Claim Information   Patient Name: Logan Mason  Claim Number:     Employer:   [employer] Date of Injury: 3/7/2023     Insurer / TPA: [employer]  ID / SSN:     Occupation:   Diagnosis: The encounter diagnosis was Strain of lumbar region, subsequent encounter.    Medical Information   Related to Industrial Injury? Yes    Subjective Complaints:  Original HPI COPIED from 3/9/23:DOI 3/7/23: Patient is a 27-year-old male presents urgent care for evaluation of low back pain that has developed and worsened over the past few days.  Patient uncertain exact mechanism of injury however woke up with discomfort in his low back and when he returned back to work it was exacerbated with the repetitive bending and heavy lifting that he does not work.  He denies any numbness or tingling in bilateral lower extremities.  He has decreased range of motion due to stiffness in the low back.  He denies any loss of bowel or bladder.  He has tried taking Tylenol Motrin with minimal relief in symptoms.  He has had low back pain in the past however is not chronic and/or persistent.  He denies second job.     3/31/23 Fourth visit: Patient reports he has not been to work secondary to work restrictions.  He reports some improvement to his back.  He reports that he is about 85% improved.  He reports continued pain with bending to mid back.  He reports using stretches which has been helpful.  Patient recently had [place] paperwork filed with primary care.  Patient denies radiating pain.  No numbness or tingling.  No weakness.      ROS per HPI   Objective Findings: Physical Exam  Vitals and nursing note reviewed.   Constitutional:       General: He is not  in acute distress.     Appearance: Normal appearance. He is normal weight. He is not ill-appearing or toxic-appearing.   HENT:      Head: Normocephalic and atraumatic.   Musculoskeletal:      Comments: Low back: + TTP to paraspinal muscles.  No obvious deformity, no erythema, no swelling, no step-offs, no midline tenderness.  No antalgic gait. 5/5 to lower extremities.    Skin:     General: Skin is warm and dry.      Capillary Refill: Capillary refill takes less than 2 seconds.   Neurological:      Mental Status: He is alert.      Gait: Gait normal.      Pre-Existing Condition(s):     Assessment:   Condition Same    Status: Discharged / Care Transfer  Permanent Disability:No    Plan:   Comments:Continue stretches, alternate Tylenol ibuprofen as needed for pain, alternate heat and ice application, follow-up with occupational medicine    Diagnostics:      Comments:       Disability Information   Status: Released to Restricted Duty    From:  3/31/2023  Through:   Restrictions are: Temporary   Physical Restrictions   Sitting:    Standing:    Stooping:    Bendin hrs/day   Squatting:    Walking:    Climbing:    Pushing:      Pulling:    Other:    Reaching Above Shoulder (L):   Reaching Above Shoulder (R):       Reaching Below Shoulder (L):    Reaching Below Shoulder (R):      Not to exceed Weight Limits   Carrying(hrs):   Weight Limit(lb): < or = to 25 pounds Lifting(hrs):   Weight  Limit(lb):     Comments:      Repetitive Actions   Hands: i.e. Fine Manipulations from Grasping:     Feet: i.e. Operating Foot Controls:     Driving / Operate Machinery:     Health Care Provider’s Original or Electronic Signature  AKIL Murdock Health Care Provider’s Original or Electronic Signature    Charles Scanlon DO MPH     Clinic Name / Location: [place] [address]  [place] [address] Clinic Phone Number: Dept: [phone]   Appointment Time: 12:00 Pm Visit Start Time: 12:40 PM   Check-In Time:   12:06 Pm Visit Discharge Time: 1:31 PM    Original-Treating Physician or Chiropractor    Page 2-Insurer/TPA    Page 3-Employer    Page 4-Employee

## 2023-04-04 ENCOUNTER — OFFICE VISIT (OUTPATIENT)
Dept: URGENT CARE | Facility: CLINIC | Age: 28
End: 2023-04-04
Payer: MEDICAID

## 2023-04-04 VITALS
SYSTOLIC BLOOD PRESSURE: 128 MMHG | OXYGEN SATURATION: 100 % | TEMPERATURE: 97.6 F | RESPIRATION RATE: 14 BRPM | WEIGHT: 154 LBS | BODY MASS INDEX: 23.34 KG/M2 | HEIGHT: 68 IN | DIASTOLIC BLOOD PRESSURE: 76 MMHG | HEART RATE: 89 BPM

## 2023-04-04 DIAGNOSIS — S39.012D STRAIN OF LUMBAR REGION, SUBSEQUENT ENCOUNTER: ICD-10-CM

## 2023-04-04 DIAGNOSIS — Z02.89 ENCOUNTER TO OBTAIN EXCUSE FROM WORK: ICD-10-CM

## 2023-04-04 PROCEDURE — 99212 OFFICE O/P EST SF 10 MIN: CPT | Performed by: NURSE PRACTITIONER

## 2023-04-04 NOTE — LETTER
April 4, 2023    To Whom It May Concern:         This is confirmation that Logan Mason attended his scheduled appointment with AKIL Peng on 4/04/23. Workman's compensation claim denied. Workman's compensation work restrictions released. Logan's back pain is improved. He may return to work 4/5/23 with no restrictions.     Sincerely,          MARIA LUISA Peng.  [phone]

## 2023-04-04 NOTE — PROGRESS NOTES
Patient has consented to treatment and for use of patient information for treatment and billing purposes.    Date: 04/04/23     Arrival Mode: Private Vehicle    Chief Complaint:    Chief Complaint   Patient presents with    Follow-Up     WC claim denied, needs to return to work, low back injury        History of Present Illness: 27 y.o.  male presents to clinic for a return to work note.  Patient was previously seen by occupational medicine for low back injury.  His back injury was caused by heavy lifting and bending.  Patient states he feels 95 to 100% better.  He states he feels ready to go back to work full duty.  Unfortunately his Workmen's Comp. claim was denied any presents to clinic requesting a work note to return to work.  Denies loss of bowel or bladder control fevers body aches nausea vomiting diarrhea.  No focal progressive neurological deficits as discussed.  No saddle anesthesia.      ROS:    As stated in HPI     Pertinent Medical History:  No past medical history on file.     Pertinent Surgical History:  No past surgical history on file.     Pertinent Medications:    No current outpatient medications on file prior to visit.     No current facility-administered medications on file prior to visit.        Allergies:    Patient has no known allergies.     Social History:  Social History     Tobacco Use    Smoking status: Never     Passive exposure: Never    Smokeless tobacco: Never   Vaping Use    Vaping Use: Some days    Substances: THC    Devices: Pre-filled or refillable cartridge   Substance Use Topics    Alcohol use: Yes     Comment: rare occassions    Drug use: Yes     Frequency: 7.0 times per week     Types: Marijuana, Inhaled     Comment: daily since age [age]        No LMP for male patient.           Physical Exam:    Vitals:    04/04/23 1639   BP: 128/76   Pulse: 89   Resp: 14   Temp: 36.4 °C (97.6 °F)   SpO2: 100%             Physical Exam  Constitutional:       Appearance: Normal appearance.    HENT:      Head: Normocephalic and atraumatic.   Musculoskeletal:      Cervical back: Normal and normal range of motion.      Thoracic back: Normal.      Lumbar back: Normal.   Neurological:      Mental Status: He is alert.            Diagnostics:    None     Medical Decision making and clinic course :  I personally reviewed prior external notes and test results pertinent to today's visit. Pt is clinically stable at today's acute urgent care visit.  No acute distress noted. Appropriate for outpatient care at this time. Shared decision-making was utilized with patient for treatment plan.    Pleasant nontoxic-appearing 27-year-old male presenting clinic for work excuse.  No tenderness on exam no red flag findings.  Will provide a work note for patient to return to work full duty.  Did advise appropriate lifting techniques.  If he does begin having continued pain he may consider obtaining a different type of job or following up with his PCP.    The patient remained stable during the urgent care visit.    Plan:        1. Encounter to obtain excuse from work      2. Strain of lumbar region, subsequent encounter         All of the patient's questions were answered to their satisfaction at the time of discharge.    Follow up:      Patient was encouraged to monitor symptoms closely. Those signs and symptoms which would warrant concern and mandate seeking a higher level of service through the emergency department discussed at length and included in discharge papers.  Patient stated agreement and understanding of this plan of care.    Disposition:  Home in stable condition       Voice Recognition Disclaimer:  Portions of this document were created using voice recognition software. The software does have a chance of producing errors of grammar and possibly content. I have made every reasonable attempt to correct obvious errors, but there may be errors of grammar and possibly content that I did not discover before finalizing  the documentation.    MARIA LUISA Peng.

## 2023-08-09 ENCOUNTER — OFFICE VISIT (OUTPATIENT)
Dept: URGENT CARE | Facility: CLINIC | Age: 28
End: 2023-08-09
Payer: COMMERCIAL

## 2023-08-09 VITALS
SYSTOLIC BLOOD PRESSURE: 128 MMHG | OXYGEN SATURATION: 99 % | WEIGHT: 150.5 LBS | HEIGHT: 67 IN | HEART RATE: 77 BPM | DIASTOLIC BLOOD PRESSURE: 82 MMHG | RESPIRATION RATE: 16 BRPM | TEMPERATURE: 98.1 F | BODY MASS INDEX: 23.62 KG/M2

## 2023-08-09 DIAGNOSIS — R19.7 DIARRHEA, UNSPECIFIED TYPE: ICD-10-CM

## 2023-08-09 DIAGNOSIS — R11.0 NAUSEA: ICD-10-CM

## 2023-08-09 PROBLEM — N47.6 BALANOPOSTHITIS: Status: ACTIVE | Noted: 2022-06-12

## 2023-08-09 LAB
APPEARANCE UR: CLEAR
BILIRUB UR STRIP-MCNC: NEGATIVE MG/DL
COLOR UR AUTO: YELLOW
FLUAV RNA SPEC QL NAA+PROBE: NEGATIVE
FLUBV RNA SPEC QL NAA+PROBE: NEGATIVE
GLUCOSE UR STRIP.AUTO-MCNC: NEGATIVE MG/DL
KETONES UR STRIP.AUTO-MCNC: NEGATIVE MG/DL
LEUKOCYTE ESTERASE UR QL STRIP.AUTO: NEGATIVE
NITRITE UR QL STRIP.AUTO: NEGATIVE
PH UR STRIP.AUTO: 6 [PH] (ref 5–8)
PROT UR QL STRIP: NEGATIVE MG/DL
RBC UR QL AUTO: NORMAL
RSV RNA SPEC QL NAA+PROBE: NEGATIVE
SARS-COV-2 RNA RESP QL NAA+PROBE: NEGATIVE
SP GR UR STRIP.AUTO: <=1.005
UROBILINOGEN UR STRIP-MCNC: NORMAL MG/DL

## 2023-08-09 PROCEDURE — 3074F SYST BP LT 130 MM HG: CPT

## 2023-08-09 PROCEDURE — 99213 OFFICE O/P EST LOW 20 MIN: CPT

## 2023-08-09 PROCEDURE — 81002 URINALYSIS NONAUTO W/O SCOPE: CPT

## 2023-08-09 PROCEDURE — 3079F DIAST BP 80-89 MM HG: CPT

## 2023-08-09 PROCEDURE — 0241U POCT CEPHEID COV-2, FLU A/B, RSV - PCR: CPT

## 2023-08-09 RX ORDER — ONDANSETRON 4 MG/1
4 TABLET, ORALLY DISINTEGRATING ORAL EVERY 6 HOURS PRN
Qty: 15 TABLET | Refills: 0 | Status: SHIPPED | OUTPATIENT
Start: 2023-08-09 | End: 2024-01-23

## 2023-08-09 RX ORDER — ONDANSETRON 4 MG/1
4 TABLET, ORALLY DISINTEGRATING ORAL ONCE
Status: COMPLETED | OUTPATIENT
Start: 2023-08-09 | End: 2023-08-09

## 2023-08-09 RX ADMIN — ONDANSETRON 4 MG: 4 TABLET, ORALLY DISINTEGRATING ORAL at 15:39

## 2023-08-09 NOTE — LETTER
August 9, 2023    To Whom It May Concern:         This is confirmation that Logan Mason attended his scheduled appointment with AKIL Geronimo on 8/09/23.    He may return to work on Friday 08/11/23.         If you have any questions please do not hesitate to call me at the phone number listed below.    Sincerely,          MARIA LUISA Geronimo.  [phone]

## 2023-08-09 NOTE — PROGRESS NOTES
Subjective:   Logan Mason is a 27 y.o. male who presents for Nausea (Nausea and diarrhea x 2 days. The patient is feeling dehydrated.) and Letter for School/Work (Would like a note for work.)      HPI:    Patient presents to urgent care with concerns of nausea, vomiting, diarrhea x 2 days.  Reports slight LLQ pain  Pain is intermittent, described as cramping.  Reports subjective fever and chills  Decreased appetite, tolerating fluids. Reports he is able to drink fluids, but states keeping them down is difficult.  Denies history of constipation, or increased time to pass stool.  States stool is dark and watery. Denies tar-like stools, mucus.  Denies heartburn. Not aggravated with food.  Denies hematuria, dysuria  Denies sore throat, rhinorrhea, congestion, cough  Denies recent traveling, hospitalization, or use of antibiotics.  Denies use of street drugs. No new medications or changes in medications.   States he was hospitalized once and was told he may have UC.   Never evaluated by GI  States his issue day is often recurrent.  No insurance at this time.  Needs work note.      ROS As above in HPI    Medications:    No current outpatient medications on file prior to visit.     No current facility-administered medications on file prior to visit.        Allergies:   Patient has no known allergies.    Problem List:   Patient Active Problem List   Diagnosis    Lumbar spine strain    Balanoposthitis        Surgical History:  No past surgical history on file.    Past Social Hx:   Social History     Tobacco Use    Smoking status: Never     Passive exposure: Never    Smokeless tobacco: Never   Vaping Use    Vaping Use: Some days    Substances: THC    Devices: Pre-filled or refillable cartridge   Substance Use Topics    Alcohol use: Yes     Comment: rare occassions    Drug use: Yes     Frequency: 7.0 times per week     Types: Marijuana, Inhaled     Comment: daily since age [age]          Problem list, medications, and  "allergies reviewed by myself today in Epic.     Objective:     /82 (BP Location: Right arm, Patient Position: Sitting, BP Cuff Size: Adult)   Pulse 77   Temp 36.7 °C (98.1 °F) (Temporal)   Resp 16   Ht 1.702 m (5' 7\")   Wt 68.3 kg (150 lb 8 oz)   SpO2 99%   BMI 23.57 kg/m²     Physical Exam  Vitals and nursing note reviewed.   Constitutional:       General: He is not in acute distress.     Appearance: Normal appearance. He is not ill-appearing or diaphoretic.   HENT:      Head: Normocephalic.      Right Ear: Tympanic membrane and ear canal normal.      Left Ear: Tympanic membrane and ear canal normal.      Nose: Nose normal. No congestion or rhinorrhea.      Mouth/Throat:      Mouth: Mucous membranes are moist.      Pharynx: Oropharynx is clear. No oropharyngeal exudate or posterior oropharyngeal erythema.   Eyes:      Extraocular Movements: Extraocular movements intact.      Pupils: Pupils are equal, round, and reactive to light.   Cardiovascular:      Rate and Rhythm: Normal rate and regular rhythm.      Heart sounds: Normal heart sounds. No murmur heard.     No friction rub. No gallop.   Pulmonary:      Effort: Pulmonary effort is normal.      Breath sounds: Normal breath sounds.   Abdominal:      General: Abdomen is flat. Bowel sounds are normal. There is no distension.      Palpations: Abdomen is soft. There is no mass.      Tenderness: There is no abdominal tenderness. There is no right CVA tenderness, left CVA tenderness, guarding or rebound.      Hernia: No hernia is present.   Musculoskeletal:      Cervical back: No rigidity or tenderness.   Lymphadenopathy:      Cervical: No cervical adenopathy.   Skin:     General: Skin is warm and dry.      Capillary Refill: Capillary refill takes less than 2 seconds.      Findings: No rash.   Neurological:      Mental Status: He is alert and oriented to person, place, and time.         Assessment/Plan:       Results for orders placed or performed in visit " on 08/09/23   POCT CEPHEID COV-2, FLU A/B, RSV - PCR   Result Value Ref Range    SARS-CoV-2 by PCR Negative Negative, Invalid    Influenza virus A RNA Negative Negative, Invalid    Influenza virus B, PCR Negative Negative, Invalid    RSV, PCR Negative Negative, Invalid   POCT Urinalysis   Result Value Ref Range    POC Color yellow Negative    POC Appearance clear Negative    POC Glucose negative Negative mg/dL    POC Bilirubin negative Negative mg/dL    POC Ketones negative Negative mg/dL    POC Specific Gravity <=1.005 <1.005 - >1.030    POC Blood trace-intact Negative    POC Urine PH 6.0 5.0 - 8.0    POC Protein negative Negative mg/dL    POC Urobiligen 0.2 E.U./dl Negative (0.2) mg/dL    POC Nitrites negative Negative    POC Leukocyte Esterase negative Negative       Diagnosis and associated orders:   1. Nausea  - POCT CEPHEID COV-2, FLU A/B, RSV - PCR  - ondansetron (Zofran ODT) dispertab 4 mg  - ondansetron (ZOFRAN ODT) 4 MG TABLET DISPERSIBLE; Take 1 Tablet by mouth every 6 hours as needed for Nausea/Vomiting for up to 15 doses.  Dispense: 15 Tablet; Refill: 0    2. Diarrhea, unspecified type  - POCT CEPHEID COV-2, FLU A/B, RSV - PCR  - POCT Urinalysis        Comments/MDM:     UA: +trace blood, -leukocytes, -nitrites  Negative poc covid, influenza, rsv  Reports alleviation of nausea with zofran.  No signs of acute abdomen requiring advanced imaging or surgical intervention appreciated today.  Tolerating solids and fluids after zofran.   Uninsured. Referred patient to [facility].  Strict return to ER for worsening abdominal pain and/or fever       Please note that this dictation was created using voice recognition software. I have made a reasonable attempt to correct obvious errors, but I expect that there are errors of grammar and possibly content that I did not discover before finalizing the note.    This note was electronically signed by Josie Degroot, DNP, APRN, FNP-C

## 2023-10-29 ENCOUNTER — HOSPITAL ENCOUNTER (EMERGENCY)
Facility: MEDICAL CENTER | Age: 28
End: 2023-10-29
Attending: EMERGENCY MEDICINE
Payer: MEDICAID

## 2023-10-29 VITALS
SYSTOLIC BLOOD PRESSURE: 120 MMHG | HEIGHT: 68 IN | BODY MASS INDEX: 22.73 KG/M2 | OXYGEN SATURATION: 94 % | TEMPERATURE: 98.2 F | RESPIRATION RATE: 18 BRPM | DIASTOLIC BLOOD PRESSURE: 70 MMHG | WEIGHT: 150 LBS | HEART RATE: 78 BPM

## 2023-10-29 DIAGNOSIS — F10.920 ALCOHOLIC INTOXICATION WITHOUT COMPLICATION (HCC): ICD-10-CM

## 2023-10-29 DIAGNOSIS — R11.2 NAUSEA AND VOMITING, UNSPECIFIED VOMITING TYPE: ICD-10-CM

## 2023-10-29 LAB
ALBUMIN SERPL BCP-MCNC: 4 G/DL (ref 3.2–4.9)
ALBUMIN/GLOB SERPL: 1.7 G/DL
ALP SERPL-CCNC: 76 U/L (ref 30–99)
ALT SERPL-CCNC: 20 U/L (ref 2–50)
ANION GAP SERPL CALC-SCNC: 12 MMOL/L (ref 7–16)
AST SERPL-CCNC: 20 U/L (ref 12–45)
BASOPHILS # BLD AUTO: 0.3 % (ref 0–1.8)
BASOPHILS # BLD: 0.02 K/UL (ref 0–0.12)
BILIRUB SERPL-MCNC: 0.4 MG/DL (ref 0.1–1.5)
BUN SERPL-MCNC: 10 MG/DL (ref 8–22)
CALCIUM ALBUM COR SERPL-MCNC: 7.8 MG/DL (ref 8.5–10.5)
CALCIUM SERPL-MCNC: 7.8 MG/DL (ref 8.5–10.5)
CHLORIDE SERPL-SCNC: 111 MMOL/L (ref 96–112)
CO2 SERPL-SCNC: 20 MMOL/L (ref 20–33)
CREAT SERPL-MCNC: 0.62 MG/DL (ref 0.5–1.4)
EOSINOPHIL # BLD AUTO: 0.19 K/UL (ref 0–0.51)
EOSINOPHIL NFR BLD: 2.9 % (ref 0–6.9)
ERYTHROCYTE [DISTWIDTH] IN BLOOD BY AUTOMATED COUNT: 40.7 FL (ref 35.9–50)
ETHANOL BLD-MCNC: 151.1 MG/DL
GFR SERPLBLD CREATININE-BSD FMLA CKD-EPI: 133 ML/MIN/1.73 M 2
GLOBULIN SER CALC-MCNC: 2.3 G/DL (ref 1.9–3.5)
GLUCOSE SERPL-MCNC: 95 MG/DL (ref 65–99)
HCT VFR BLD AUTO: 41.6 % (ref 42–52)
HGB BLD-MCNC: 14.3 G/DL (ref 14–18)
IMM GRANULOCYTES # BLD AUTO: 0.01 K/UL (ref 0–0.11)
IMM GRANULOCYTES NFR BLD AUTO: 0.2 % (ref 0–0.9)
LIPASE SERPL-CCNC: 22 U/L (ref 11–82)
LYMPHOCYTES # BLD AUTO: 3.36 K/UL (ref 1–4.8)
LYMPHOCYTES NFR BLD: 51.3 % (ref 22–41)
MCH RBC QN AUTO: 31.2 PG (ref 27–33)
MCHC RBC AUTO-ENTMCNC: 34.4 G/DL (ref 32.3–36.5)
MCV RBC AUTO: 90.6 FL (ref 81.4–97.8)
MONOCYTES # BLD AUTO: 0.4 K/UL (ref 0–0.85)
MONOCYTES NFR BLD AUTO: 6.1 % (ref 0–13.4)
NEUTROPHILS # BLD AUTO: 2.57 K/UL (ref 1.82–7.42)
NEUTROPHILS NFR BLD: 39.2 % (ref 44–72)
NRBC # BLD AUTO: 0 K/UL
NRBC BLD-RTO: 0 /100 WBC (ref 0–0.2)
PLATELET # BLD AUTO: 177 K/UL (ref 164–446)
PMV BLD AUTO: 9.3 FL (ref 9–12.9)
POTASSIUM SERPL-SCNC: 3.7 MMOL/L (ref 3.6–5.5)
PROT SERPL-MCNC: 6.3 G/DL (ref 6–8.2)
RBC # BLD AUTO: 4.59 M/UL (ref 4.7–6.1)
SODIUM SERPL-SCNC: 143 MMOL/L (ref 135–145)
WBC # BLD AUTO: 6.6 K/UL (ref 4.8–10.8)

## 2023-10-29 PROCEDURE — 85025 COMPLETE CBC W/AUTO DIFF WBC: CPT

## 2023-10-29 PROCEDURE — 700105 HCHG RX REV CODE 258: Performed by: EMERGENCY MEDICINE

## 2023-10-29 PROCEDURE — 99285 EMERGENCY DEPT VISIT HI MDM: CPT

## 2023-10-29 PROCEDURE — 36415 COLL VENOUS BLD VENIPUNCTURE: CPT

## 2023-10-29 PROCEDURE — 83690 ASSAY OF LIPASE: CPT

## 2023-10-29 PROCEDURE — 80053 COMPREHEN METABOLIC PANEL: CPT

## 2023-10-29 PROCEDURE — 82077 ASSAY SPEC XCP UR&BREATH IA: CPT

## 2023-10-29 RX ORDER — SODIUM CHLORIDE, SODIUM LACTATE, POTASSIUM CHLORIDE, CALCIUM CHLORIDE 600; 310; 30; 20 MG/100ML; MG/100ML; MG/100ML; MG/100ML
1000 INJECTION, SOLUTION INTRAVENOUS ONCE
Status: COMPLETED | OUTPATIENT
Start: 2023-10-29 | End: 2023-10-29

## 2023-10-29 RX ADMIN — SODIUM CHLORIDE, POTASSIUM CHLORIDE, SODIUM LACTATE AND CALCIUM CHLORIDE 1000 ML: 600; 310; 30; 20 INJECTION, SOLUTION INTRAVENOUS at 02:06

## 2023-10-29 NOTE — DISCHARGE SUMMARY
"  ED Observation Discharge Summary    Patient:Logan Mason  Patient : 1995  Patient MRN: [id number]  Patient PCP: Lisa Haas M.D.    Admit Date: 10/29/2023  Discharge Date and Time: 10/29/23 6:46 AM  Discharge Diagnosis: Alcohol intoxication, improved  Discharge Attending: Marquez Byers M.D.  Discharge Service: ED Observation    ED Course  Logan is a 27 y.o. male who was evaluated at [facility] initially by Dr. Ackerman.  He is found to be out for [employer] party drinking excessively at his [employer] house and comes in intoxicated with alcohol with an initial BAL of 151.  He also admits to smoking his marijuana earlier as well.  Yelitza have brought him in secondary to altered mental status and staring off    Discharge Exam:  /67   Pulse 71   Temp 36.7 °C (98 °F) (Temporal)   Resp 14   Ht 1.727 m (5' 8\")   Wt 68 kg (150 lb)   SpO2 96%   BMI 22.81 kg/m² .    Constitutional: Awake and alert. Nontoxic  HENT:  Grossly normal  Eyes: Grossly normal  Neck: Normal range of motion  Cardiovascular: Normal heart rate   Thorax & Lungs: No respiratory distress  Abdomen: Nontender  Skin:  No pathologic rash.   Extremities: Well perfused  Psychiatric: Affect normal    Labs  Results for orders placed or performed during the hospital encounter of 10/29/23   CBC WITH DIFFERENTIAL   Result Value Ref Range    WBC 6.6 4.8 - 10.8 K/uL    RBC 4.59 (L) 4.70 - 6.10 M/uL    Hemoglobin 14.3 14.0 - 18.0 g/dL    Hematocrit 41.6 (L) 42.0 - 52.0 %    MCV 90.6 81.4 - 97.8 fL    MCH 31.2 27.0 - 33.0 pg    MCHC 34.4 32.3 - 36.5 g/dL    RDW 40.7 35.9 - 50.0 fL    Platelet Count 177 164 - 446 K/uL    MPV 9.3 9.0 - 12.9 fL    Neutrophils-Polys 39.20 (L) 44.00 - 72.00 %    Lymphocytes 51.30 (H) 22.00 - 41.00 %    Monocytes 6.10 0.00 - 13.40 %    Eosinophils 2.90 0.00 - 6.90 %    Basophils 0.30 0.00 - 1.80 %    Immature Granulocytes 0.20 0.00 - 0.90 %    Nucleated RBC 0.00 0.00 - 0.20 /100 WBC    Neutrophils " (Absolute) 2.57 1.82 - 7.42 K/uL    Lymphs (Absolute) 3.36 1.00 - 4.80 K/uL    Monos (Absolute) 0.40 0.00 - 0.85 K/uL    Eos (Absolute) 0.19 0.00 - 0.51 K/uL    Baso (Absolute) 0.02 0.00 - 0.12 K/uL    Immature Granulocytes (abs) 0.01 0.00 - 0.11 K/uL    NRBC (Absolute) 0.00 K/uL   COMP METABOLIC PANEL   Result Value Ref Range    Sodium 143 135 - 145 mmol/L    Potassium 3.7 3.6 - 5.5 mmol/L    Chloride 111 96 - 112 mmol/L    Co2 20 20 - 33 mmol/L    Anion Gap 12.0 7.0 - 16.0    Glucose 95 65 - 99 mg/dL    Bun 10 8 - 22 mg/dL    Creatinine 0.62 0.50 - 1.40 mg/dL    Calcium 7.8 (L) 8.5 - 10.5 mg/dL    Correct Calcium 7.8 (L) 8.5 - 10.5 mg/dL    AST(SGOT) 20 12 - 45 U/L    ALT(SGPT) 20 2 - 50 U/L    Alkaline Phosphatase 76 30 - 99 U/L    Total Bilirubin 0.4 0.1 - 1.5 mg/dL    Albumin 4.0 3.2 - 4.9 g/dL    Total Protein 6.3 6.0 - 8.2 g/dL    Globulin 2.3 1.9 - 3.5 g/dL    A-G Ratio 1.7 g/dL   LIPASE   Result Value Ref Range    Lipase 22 11 - 82 U/L   DIAGNOSTIC ALCOHOL   Result Value Ref Range    Diagnostic Alcohol 151.1 (H) <10.1 mg/dL   ESTIMATED GFR   Result Value Ref Range    GFR (CKD-EPI) 133 >60 mL/min/1.73 m 2     Patient's mental status is back to baseline and he is demonstrating functional capacity.  There is no indication that he needs further work-up at this time and his wife plans on coming to pick him up soon      My final assessment includes alcohol intoxication, resolving  Upon Reevaluation, the patient's condition has: Improved; and will be discharged.    Patient discharged from ED Observation status at 6:45 AM (Time) Hugo 29th, 2023 (Date).     Total time spent on this ED Observation discharge encounter is < 30 Minutes    Electronically signed by: Marquez Byers M.D., 10/29/2023 6:46 AM

## 2023-10-29 NOTE — ED PROVIDER NOTES
"ED Provider Note    CHIEF COMPLAINT  Chief Complaint   Patient presents with    Alcohol Intoxication     Pt BIB [facility] from home due to pt consuming 10 beers and smoking marijuana at a party this evening, wife called [facility] due to pt acting lethargic and \"staring off\"     EXTERNAL RECORDS REVIEWED  Outpatient Notes encounter on 8/9/2023 when the patient was seen for nausea and vomiting and occasional lower abdominal discomfort.    HPI/ROS  LIMITATION TO HISTORY   Select: Intoxication  OUTSIDE HISTORIAN(S):  none    Logan Mason is a 27 y.o. male who presents emergency room for evaluation of mental status changes after going out and drinking with friends and smoking marijuana.  Patient was apparently staring off and appeared altered and had thrown up at the house.  EMS was called and some fluids and Zofran were given.  At the time of my evaluation the patient is sleepy, appears clinically intoxicated, has no vital sign abnormalities there is no other known prior past medical history    PAST MEDICAL HISTORY   unknown    SURGICAL HISTORY  patient denies any surgical history    FAMILY HISTORY  Family History   Problem Relation Age of Onset    Hypertension Mother        SOCIAL HISTORY  Social History     Tobacco Use    Smoking status: Never     Passive exposure: Never    Smokeless tobacco: Never   Vaping Use    Vaping Use: Some days    Substances: THC    Devices: Pre-filled or refillable cartridge   Substance and Sexual Activity    Alcohol use: Yes     Comment: rare occassions    Drug use: Yes     Frequency: 7.0 times per week     Types: Marijuana, Inhaled     Comment: daily since age [age]    Sexual activity: Yes     Partners: Female     Birth control/protection: None       CURRENT MEDICATIONS  Home Medications    **Home medications have not yet been reviewed for this encounter**         ALLERGIES  No Known Allergies    PHYSICAL EXAM  VITAL SIGNS: /63   Pulse (!) 59   Temp 37.1 °C (98.7 °F) (Temporal)   Resp 19  " " Ht 1.727 m (5' 8\")   Wt 68 kg (150 lb)   SpO2 94%   BMI 22.81 kg/m²    General: Alert, Oriented x1. Acutely intoxicated, sleepy, Non-toxic appearing.   Head: Normocephalic, Atraumatic.   Eyes: Pupils: R: 3 mm, L:3 mm. Sluggishly reactive. EOMI. Sclerae/Conjunctivae normal in appearance. No Raccoon Eyes.   Nose: No septal hematomas.   Ears: No hemotymapnum, no Higgins Sign.   Mouth: No midface instability. No malocclusion.   Neck: No midline tenderness, step-off, or hematoma.   Back: No TTP. No step-off, or hematoma.   Chest: No retractions. Chest wall is non-tender   Lungs: Clear and equal to auscultation bilaterally. No wheezes, rales, or rhonchi. No respiratory distress.   Cardiovascular: Regular Rate and Rhythm. Normal S1 and S2.   Abdomen: Soft, non-distended, non-tender. No rebound or guarding.   Pelvis: Stable   Musculoskeletal: Full active and passive ROM of bilateral shoulders, elbows, wrists, hips, knees, and ankles without pain or tenderness.   Neuro: A&O x1.  Nonparticipatory with neurological exam but patient moves all extremities spontaneously.  Responds to noxious stimuli and has gross intact sensory exam.    Skin: No contusion, laceration,abrasion    DIAGNOSTIC STUDIES / PROCEDURES  LABS  Labs Reviewed   CBC WITH DIFFERENTIAL - Abnormal; Notable for the following components:       Result Value    RBC 4.59 (*)     Hematocrit 41.6 (*)     Neutrophils-Polys 39.20 (*)     Lymphocytes 51.30 (*)     All other components within normal limits   COMP METABOLIC PANEL - Abnormal; Notable for the following components:    Calcium 7.8 (*)     Correct Calcium 7.8 (*)     All other components within normal limits   DIAGNOSTIC ALCOHOL - Abnormal; Notable for the following components:    Diagnostic Alcohol 151.1 (*)     All other components within normal limits   LIPASE   ESTIMATED GFR     COURSE & MEDICAL DECISION MAKING    ED Observation Status? Yes; I am placing the patient in to an observation status due to a " diagnostic uncertainty as well as therapeutic intensity. Patient placed in observation status at 3:07 AM, 10/29/2023.     Observation plan is as follows: ER observation anticipate greater than 4 hours as the patient is intoxicated and will need to be clinically cleared by signout physician as patient staying beyond my completion of shift.    INITIAL ASSESSMENT, COURSE AND PLAN  Alcohol intoxication, metabolic derangement, polysubstance abuse, hypoglycemia.    Care Narrative: Patient presents emergency room for symptoms as described above.  The patient had apparently been drinking and smoked marijuana and was apparently not acting like himself.  He was somewhat sluggish and his wife called ambulance for evaluation.  On initial assessment the patient is arousable, he will slur his name but will not answer any further questions.  He has no evidence of external trauma, no focal neurological deficits, he appears intoxicated however these changes need to be excluded from possible hypoglycemia and other metabolic derangements.    Lab work is already been established in triage, slight dry mucous membranes as the patient had vomited but given the length of the fluids and observed for metabolization of intoxicants.      Lab work showed no significant metabolic derangements, no concerning anemia or leukocytosis or evidence of struct of hepatobiliary process or pancreatitis.  No HUNTER.  Alcohol level is over 150 and anticipate multiple hours before the patient can be clinically evaluated and cleared.  At the time of signout patient is pending sober reevaluation and discharge at the discretion of the oncoming provider.      HYDRATION: Based on the patient's presentation of Acute Vomiting, Inability to take oral fluids, and Other intoxication the patient was given IV fluids. IV Hydration was used because oral hydration was not adequate alone. Upon recheck following hydration, the patient was improving.    FINAL DIAGNOSIS  1.  Alcoholic intoxication without complication (HCC)    2. Nausea and vomiting, unspecified vomiting type      Electronically signed by: Santana Ackerman M.D., 10/29/2023 1:47 AM

## 2023-10-29 NOTE — ED TRIAGE NOTES
"Chief Complaint   Patient presents with    Alcohol Intoxication     Pt BIB [facility] from home due to pt consuming 10 beers and smoking marijuana at a party this evening, wife called [facility] due to pt acting lethargic and \"staring off\"     PTA: pt received 4mg zofran and 500cc NS    "

## 2023-10-29 NOTE — ED NOTES
Pt discharged . GCS 15. IV discontinued and gauze placed, pt in possession of belongings. Pt provided discharge education and information pertaining to medications and follow up appointments. Pt received copy of discharge instructions and verbalized understanding.     Vitals:    10/29/23 0600   BP: 120/70   Pulse: 78   Resp: 18   Temp: 36.8 °C (98.2 °F)   SpO2: 94%

## 2023-10-29 NOTE — ED NOTES
Assumed care of patient, patient bedside report received from JAME Pierce . Pt AAO X 4 , respirations even and unlabored, on room air . Fall precautions in place.

## 2023-10-29 NOTE — DISCHARGE INSTRUCTIONS
Please avoid excessive drinking in the future  Drink plenty of nonalcoholic fluids today to replenish such as low sugar Gatorade

## 2023-11-18 ENCOUNTER — OFFICE VISIT (OUTPATIENT)
Dept: URGENT CARE | Facility: CLINIC | Age: 28
End: 2023-11-18
Payer: COMMERCIAL

## 2023-11-18 ENCOUNTER — HOSPITAL ENCOUNTER (OUTPATIENT)
Facility: MEDICAL CENTER | Age: 28
End: 2023-11-18
Attending: NURSE PRACTITIONER
Payer: COMMERCIAL

## 2023-11-18 VITALS
DIASTOLIC BLOOD PRESSURE: 54 MMHG | HEART RATE: 97 BPM | OXYGEN SATURATION: 94 % | HEIGHT: 67 IN | SYSTOLIC BLOOD PRESSURE: 110 MMHG | WEIGHT: 148.9 LBS | TEMPERATURE: 99.9 F | BODY MASS INDEX: 23.37 KG/M2 | RESPIRATION RATE: 18 BRPM

## 2023-11-18 DIAGNOSIS — R68.89 FLU-LIKE SYMPTOMS: ICD-10-CM

## 2023-11-18 DIAGNOSIS — N30.00 ACUTE CYSTITIS WITHOUT HEMATURIA: ICD-10-CM

## 2023-11-18 LAB
APPEARANCE UR: CLEAR
BILIRUB UR STRIP-MCNC: NEGATIVE MG/DL
COLOR UR AUTO: YELLOW
FLUAV RNA SPEC QL NAA+PROBE: NEGATIVE
FLUBV RNA SPEC QL NAA+PROBE: NEGATIVE
GLUCOSE UR STRIP.AUTO-MCNC: NEGATIVE MG/DL
KETONES UR STRIP.AUTO-MCNC: NEGATIVE MG/DL
LEUKOCYTE ESTERASE UR QL STRIP.AUTO: NORMAL
NITRITE UR QL STRIP.AUTO: POSITIVE
PH UR STRIP.AUTO: 7.5 [PH] (ref 5–8)
PROT UR QL STRIP: NEGATIVE MG/DL
RBC UR QL AUTO: NEGATIVE
RSV RNA SPEC QL NAA+PROBE: NEGATIVE
S PYO DNA SPEC NAA+PROBE: NOT DETECTED
SARS-COV-2 RNA RESP QL NAA+PROBE: NEGATIVE
SP GR UR STRIP.AUTO: 1.01
UROBILINOGEN UR STRIP-MCNC: 0.2 MG/DL

## 2023-11-18 PROCEDURE — 81002 URINALYSIS NONAUTO W/O SCOPE: CPT | Performed by: NURSE PRACTITIONER

## 2023-11-18 PROCEDURE — 87186 SC STD MICRODIL/AGAR DIL: CPT

## 2023-11-18 PROCEDURE — 87077 CULTURE AEROBIC IDENTIFY: CPT

## 2023-11-18 PROCEDURE — 3078F DIAST BP <80 MM HG: CPT | Performed by: NURSE PRACTITIONER

## 2023-11-18 PROCEDURE — 87651 STREP A DNA AMP PROBE: CPT | Performed by: NURSE PRACTITIONER

## 2023-11-18 PROCEDURE — 0241U POCT CEPHEID COV-2, FLU A/B, RSV - PCR: CPT | Performed by: NURSE PRACTITIONER

## 2023-11-18 PROCEDURE — 3074F SYST BP LT 130 MM HG: CPT | Performed by: NURSE PRACTITIONER

## 2023-11-18 PROCEDURE — 87086 URINE CULTURE/COLONY COUNT: CPT

## 2023-11-18 PROCEDURE — 99213 OFFICE O/P EST LOW 20 MIN: CPT | Performed by: NURSE PRACTITIONER

## 2023-11-18 RX ORDER — CEPHALEXIN 500 MG/1
1000 CAPSULE ORAL EVERY 6 HOURS
Qty: 112 CAPSULE | Refills: 0 | Status: SHIPPED | OUTPATIENT
Start: 2023-11-18 | End: 2023-12-02

## 2023-11-18 ASSESSMENT — FIBROSIS 4 INDEX: FIB4 SCORE: 0.73

## 2023-11-18 NOTE — PROGRESS NOTES
Subjective:     Logan Mason is a 28 y.o. male who presents for Headache (Pt has a headache, body aches, chills x last night )      Influenza    Pt presents for evaluation of a new problem. Logan is a very pleasant 28-year-old male who presents to urgent care today with complaints of flulike symptoms including fever, chills, headache and body aches.  His symptoms began abruptly last night.  His symptoms remain persistent.  He denies any known ill contacts.  He is taking Laina-Acton for relief of his symptoms.  He denies cough, congestion or sore throat at this time.  He does endorse nausea and urinary frequency.    ROS    PMH: No past medical history on file.  ALLERGIES: No Known Allergies  SURGHX: No past surgical history on file.  SOCHX:   Social History     Socioeconomic History    Marital status:      Spouse name: Martha    Number of children: 0    Highest education level: 12th grade   Tobacco Use    Smoking status: Never     Passive exposure: Never    Smokeless tobacco: Never   Vaping Use    Vaping Use: Every day    Substances: THC    Devices: Pre-filled or refillable cartridge   Substance and Sexual Activity    Alcohol use: Yes     Comment: rare occassions    Drug use: Yes     Frequency: 7.0 times per week     Types: Marijuana, Inhaled     Comment: daily since age [age]    Sexual activity: Yes     Partners: Female     Birth control/protection: None     Social Determinants of Health     Financial Resource Strain: Medium Risk (2/5/2023)    Overall Financial Resource Strain (CARDIA)     Difficulty of Paying Living Expenses: Somewhat hard   Food Insecurity: Food Insecurity Present (2/5/2023)    Hunger Vital Sign     Worried About Running Out of Food in the Last Year: Sometimes true     Ran Out of Food in the Last Year: Sometimes true   Transportation Needs: No Transportation Needs (2/5/2023)    PRAPARE - Transportation     Lack of Transportation (Medical): No     Lack of Transportation (Non-Medical): No  "  Physical Activity: Sufficiently Active (2/5/2023)    Exercise Vital Sign     Days of Exercise per Week: 5 days     Minutes of Exercise per Session: 150+ min   Stress: No Stress Concern Present (2/5/2023)    [employer] [place] [employer] - Occupational Stress Questionnaire     Feeling of Stress : Only a little   Social Connections: Socially Isolated (2/5/2023)    Social Connection and Isolation Panel [NHANES]     Frequency of Communication with Friends and Family: Never     Frequency of Social Gatherings with Friends and Family: Never     Attends Catholic Services: Never     Active Member of Clubs or Organizations: No     Attends Club or Organization Meetings: Never     Marital Status:    Housing Stability: High Risk (2/5/2023)    Housing Stability Vital Sign     Unable to Pay for Housing in the Last Year: Yes     Number of Places Lived in the Last Year: 1     Unstable Housing in the Last Year: No     FH:   Family History   Problem Relation Age of Onset    Hypertension Mother          Objective:   /54 (BP Location: Left arm, Patient Position: Sitting, BP Cuff Size: Adult)   Pulse 97   Temp 37.7 °C (99.9 °F) (Temporal)   Resp 18   Ht 1.702 m (5' 7\")   Wt 67.5 kg (148 lb 14.4 oz)   SpO2 94%   BMI 23.32 kg/m²     Physical Exam  Vitals and nursing note reviewed.   Constitutional:       General: He is not in acute distress.     Appearance: Normal appearance. He is normal weight. He is ill-appearing. He is not toxic-appearing.   HENT:      Head: Normocephalic.      Right Ear: Tympanic membrane, ear canal and external ear normal.      Left Ear: Tympanic membrane, ear canal and external ear normal.      Nose: Nose normal. No congestion or rhinorrhea.      Mouth/Throat:      Mouth: Mucous membranes are moist.      Pharynx: Posterior oropharyngeal erythema present.      Comments: Tonsils bilaterally +2, uvula is midline.  Negative for swelling.  Eyes:      General:         Right eye: No " discharge.         Left eye: No discharge.      Extraocular Movements: Extraocular movements intact.      Conjunctiva/sclera: Conjunctivae normal.      Pupils: Pupils are equal, round, and reactive to light.   Cardiovascular:      Rate and Rhythm: Normal rate and regular rhythm.   Pulmonary:      Effort: Pulmonary effort is normal. No respiratory distress.      Breath sounds: Normal breath sounds. No stridor. No wheezing, rhonchi or rales.   Chest:      Chest wall: No tenderness.   Abdominal:      General: Abdomen is flat.      Tenderness: There is abdominal tenderness.   Musculoskeletal:         General: Normal range of motion.      Cervical back: Normal range of motion and neck supple. Tenderness present. No rigidity.   Lymphadenopathy:      Cervical: No cervical adenopathy.   Skin:     General: Skin is warm and dry.   Neurological:      General: No focal deficit present.      Mental Status: He is alert and oriented to person, place, and time. Mental status is at baseline.   Psychiatric:         Mood and Affect: Mood normal.         Behavior: Behavior normal.         Judgment: Judgment normal.       Assessment/Plan:   Assessment    1. Flu-like symptoms  POCT Urinalysis    POCT CEPHEID COV-2, FLU A/B, RSV - PCR    POCT CEPHEID GROUP A STREP - PCR      2. Acute cystitis without hematuria  cephALEXin (KEFLEX) 500 MG Cap          Urine is positive for nitrates and leukocytes.  Due to flulike symptoms, increased urinary frequency and positive findings on urinalysis he will be empirically treated for UTI.  Strep and viral panel negative in the clinic today.  Patient to increase fluid intake and return to clinic for worsening or persistent symptoms.

## 2023-11-18 NOTE — LETTER
November 18, 2023    To Whom It May Concern:         This is confirmation that Logan Mason attended his scheduled appointment with AKIL Mcclendon on 11/18/23. Please excuse his absence due to an acute illness. He may return to work on 11/23/2023 or sooner if better.          If you have any questions please do not hesitate to call me at the phone number listed below.    Sincerely,          MARIA LUISA Mcclendon.  [phone]

## 2023-11-21 LAB
BACTERIA UR CULT: ABNORMAL
BACTERIA UR CULT: ABNORMAL
SIGNIFICANT IND 70042: ABNORMAL
SITE SITE: ABNORMAL
SOURCE SOURCE: ABNORMAL

## 2023-12-08 NOTE — ED NOTES
Patient ambulated in steady gait. AAO X4, respirations even and unlabored on room air, vitally stable.    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 His recent symptoms including period of depressive symptoms, current elevated mood, grandiosity, ideas of reference, auditory hallucinations, decreased need for sleep, marijuana use.     The patient is calm and cooperative in the ED. Patient does NOT meet criteria for inpatient hospitalization r/t symptoms of christine and psychosis and voluntary admission was offered, Parents in agreement initiation of seroquel 50mg PO.    Urgent referral to ETP made and parents to follow up at  school urge on Friday at 3pm.  Risks, benefits, and side effects of medication reviewed with patient and parents, verbalized understanding. Patient is a 14 year old male, domiciled with mom, dad, and 2 sisters, enrolled in [email or web address] in 10th grade in general education, no past psychiatric history, no outpatient treatment,  no prior psychiatric hospitalizations, no suicide attempts, no non-suicidal self injury, no aggression, no legal, 2 year history of substance use in the form of vaping marijuana, no trauma, with no relevant past medical history who presents to [facility] ED brought in by mom and dad after  referred the patient to  urgent care center, and from there the patient was sent here for possible admission to [facility] hospital. His recent symptoms including period of depressive symptoms, current elevated mood, grandiosity, ideas of reference, auditory hallucinations, decreased need for sleep, marijuana use.     The patient is calm and cooperative in the ED. Patient does NOT meet criteria for inpatient hospitalization r/t symptoms of christine and psychosis and voluntary admission was offered, Parents in agreement initiation of seroquel 50mg PO.    Urgent referral to ETP made and parents to follow up at  school urge on Friday at 3pm.  Risks, benefits, and side effects of medication reviewed with patient and parents, verbalized understanding.

## 2024-01-23 ENCOUNTER — HOSPITAL ENCOUNTER (OUTPATIENT)
Dept: RADIOLOGY | Facility: MEDICAL CENTER | Age: 29
End: 2024-01-23
Attending: NURSE PRACTITIONER
Payer: COMMERCIAL

## 2024-01-23 ENCOUNTER — OFFICE VISIT (OUTPATIENT)
Dept: URGENT CARE | Facility: CLINIC | Age: 29
End: 2024-01-23
Payer: COMMERCIAL

## 2024-01-23 VITALS
WEIGHT: 148.7 LBS | RESPIRATION RATE: 13 BRPM | HEIGHT: 67 IN | HEART RATE: 80 BPM | TEMPERATURE: 98.4 F | DIASTOLIC BLOOD PRESSURE: 60 MMHG | BODY MASS INDEX: 23.34 KG/M2 | SYSTOLIC BLOOD PRESSURE: 108 MMHG | OXYGEN SATURATION: 97 %

## 2024-01-23 DIAGNOSIS — S66.911A STRAIN OF RIGHT WRIST, INITIAL ENCOUNTER: ICD-10-CM

## 2024-01-23 PROCEDURE — 3074F SYST BP LT 130 MM HG: CPT | Performed by: NURSE PRACTITIONER

## 2024-01-23 PROCEDURE — 3078F DIAST BP <80 MM HG: CPT | Performed by: NURSE PRACTITIONER

## 2024-01-23 PROCEDURE — 73110 X-RAY EXAM OF WRIST: CPT | Mod: RT

## 2024-01-23 PROCEDURE — 99213 OFFICE O/P EST LOW 20 MIN: CPT | Performed by: NURSE PRACTITIONER

## 2024-01-23 ASSESSMENT — FIBROSIS 4 INDEX: FIB4 SCORE: 0.73

## 2024-01-23 NOTE — PROGRESS NOTES
Chief Complaint   Patient presents with    Wrist Injury         HISTORY OF PRESENT ILLNESS: Patient is a pleasant 28 y.o. male who presents to urgent care today with concerns of right wrist pain.  Patient states that he developed right wrist pain yesterday, with no particular movement or action.  Patient states he has had previous right wrist injuries from skateboarding, otherwise denies any recent injury or trauma.  Patient now endorses swelling and pain to mid aspect of wrist.  He is right-hand dominant.  He has not tried medication for symptom relief.    Patient Active Problem List    Diagnosis Date Noted    Lumbar spine strain 03/28/2023    Balanoposthitis 06/12/2022       Allergies:Patient has no known allergies.    No current [facility]-ordered outpatient medications on file.     No current [facility]-ordered facility-administered medications on file.       History reviewed. No pertinent past medical history.    Social History     Tobacco Use    Smoking status: Never     Passive exposure: Never    Smokeless tobacco: Never   Vaping Use    Vaping Use: Every day    Substances: THC    Devices: Pre-filled or refillable cartridge   Substance Use Topics    Alcohol use: Yes     Comment: rare occassions    Drug use: Yes     Frequency: 7.0 times per week     Types: Marijuana, Inhaled     Comment: daily since age [age]       Family Status   Relation Name Status    Mo  Alive    Fa  Alive     Family History   Problem Relation Age of Onset    Hypertension Mother        ROS:  Review of Systems   Constitutional: Negative for fever, chills, weight loss, malaise, and fatigue.   HENT: Negative for ear pain, nosebleeds, congestion, sore throat and neck pain.    Eyes: Negative for vision changes.   Neuro: Negative for headache, sensory changes, weakness, seizure, LOC.   Cardiovascular: Negative for chest pain, palpitations, orthopnea and leg swelling.   Respiratory: Negative for cough, sputum production, shortness of breath and wheezing.  "  Gastrointestinal: Negative for abdominal pain, nausea, vomiting or diarrhea.   Genitourinary: Negative for dysuria, urgency and frequency.  Musculoskeletal: Positive for right wrist pain.  Negative for falls, neck pain, back pain, myalgias.   Skin: Negative for rash, diaphoresis.     Exam:  /60 (BP Location: Left arm, Patient Position: Sitting, BP Cuff Size: Adult)   Pulse 80   Temp 36.9 °C (98.4 °F) (Temporal)   Resp 13   Ht 1.702 m (5' 7\")   Wt 67.4 kg (148 lb 11.2 oz)   SpO2 97%   General: well-nourished, well-developed male in NAD  Head: normocephalic, atraumatic  Eyes: PERRLA, no conjunctival injection, acuity grossly intact, lids normal.  Ears: normal shape and symmetry, no tenderness, no discharge. External canals are without any significant edema or erythema. Tympanic membranes are without any inflammation, no effusion. Gross auditory acuity is intact.  Nose: symmetrical without tenderness, no discharge.  Mouth/Throat: reasonable hygiene, no erythema, exudates or tonsillar enlargement.  Neck: no masses, range of motion within normal limits, no tracheal deviation. No obvious thyroid enlargement.   Lymph: no cervical adenopathy. No supraclavicular adenopathy.   Neuro: alert and oriented. Cranial nerves 1-12 grossly intact. No sensory deficit.   Cardiovascular: regular rate and rhythm. No edema.  Pulmonary: no distress. Chest is symmetrical with respiration, no wheezes, crackles, or rhonchi.   Musculoskeletal: no clubbing, appropriate muscle tone, gait is stable.  Right wrist: Scant swelling noted throughout with tenderness to mid aspect of wrist.  Pain with range of motion all directions.  Distal neurovascular intact.  Cap refills brisk.  Skin: warm, dry, intact, no clubbing, no cyanosis, no rashes.   Psych: appropriate mood, affect, judgement.         Assessment/Plan:  1. Strain of right wrist, initial encounter  DX-WRIST-COMPLETE 3+ RIGHT    Referral to Orthopedics          Patient presents " with right wrist pain since yesterday without any acute injury or trauma.  Suspect soft tissue etiology.  Wrist brace applied.  Wrist x-ray ordered, no x-ray available in clinic today, patient will have performed at outlying facility within 24 hours, will follow-up accordingly.  The meantime the patient may take OTC ibuprofen for pain relief.  Referral placed to orthopedics for follow-up.  Supportive care, differential diagnoses, and indications for immediate follow-up discussed with patient.   Pathogenesis of diagnosis discussed including typical length and natural progression.   Instructed to return to clinic or nearest emergency department for any change in condition, further concerns, or worsening of symptoms.  Patient states understanding of the plan of care and discharge instructions.  Instructed to make an appointment, for follow up, with his primary care provider.        Please note that this dictation was created using voice recognition software. I have made every reasonable attempt to correct obvious errors, but I expect that there are errors of grammar and possibly content that I did not discover before finalizing the note.      MARIA LUISA Rothman.

## 2024-02-21 SDOH — HEALTH STABILITY: PHYSICAL HEALTH: ON AVERAGE, HOW MANY MINUTES DO YOU ENGAGE IN EXERCISE AT THIS LEVEL?: 60 MIN

## 2024-02-21 SDOH — ECONOMIC STABILITY: HOUSING INSECURITY: IN THE LAST 12 MONTHS, HOW MANY PLACES HAVE YOU LIVED?: 1

## 2024-02-21 SDOH — ECONOMIC STABILITY: INCOME INSECURITY: HOW HARD IS IT FOR YOU TO PAY FOR THE VERY BASICS LIKE FOOD, HOUSING, MEDICAL CARE, AND HEATING?: SOMEWHAT HARD

## 2024-02-21 SDOH — ECONOMIC STABILITY: INCOME INSECURITY: IN THE LAST 12 MONTHS, WAS THERE A TIME WHEN YOU WERE NOT ABLE TO PAY THE MORTGAGE OR RENT ON TIME?: YES

## 2024-02-21 SDOH — ECONOMIC STABILITY: FOOD INSECURITY: WITHIN THE PAST 12 MONTHS, THE FOOD YOU BOUGHT JUST DIDN'T LAST AND YOU DIDN'T HAVE MONEY TO GET MORE.: NEVER TRUE

## 2024-02-21 SDOH — HEALTH STABILITY: PHYSICAL HEALTH: ON AVERAGE, HOW MANY DAYS PER WEEK DO YOU ENGAGE IN MODERATE TO STRENUOUS EXERCISE (LIKE A BRISK WALK)?: 4 DAYS

## 2024-02-21 SDOH — ECONOMIC STABILITY: FOOD INSECURITY: WITHIN THE PAST 12 MONTHS, YOU WORRIED THAT YOUR FOOD WOULD RUN OUT BEFORE YOU GOT MONEY TO BUY MORE.: SOMETIMES TRUE

## 2024-02-21 SDOH — HEALTH STABILITY: MENTAL HEALTH
STRESS IS WHEN SOMEONE FEELS TENSE, NERVOUS, ANXIOUS, OR CAN'T SLEEP AT NIGHT BECAUSE THEIR MIND IS TROUBLED. HOW STRESSED ARE YOU?: NOT AT ALL

## 2024-02-21 ASSESSMENT — SOCIAL DETERMINANTS OF HEALTH (SDOH)
HOW OFTEN DO YOU ATTENT MEETINGS OF THE CLUB OR ORGANIZATION YOU BELONG TO?: NEVER
WITHIN THE PAST 12 MONTHS, YOU WORRIED THAT YOUR FOOD WOULD RUN OUT BEFORE YOU GOT THE MONEY TO BUY MORE: SOMETIMES TRUE
IN A TYPICAL WEEK, HOW MANY TIMES DO YOU TALK ON THE PHONE WITH FAMILY, FRIENDS, OR NEIGHBORS?: ONCE A WEEK
HOW OFTEN DO YOU HAVE A DRINK CONTAINING ALCOHOL: 2-3 TIMES A WEEK
HOW MANY DRINKS CONTAINING ALCOHOL DO YOU HAVE ON A TYPICAL DAY WHEN YOU ARE DRINKING: 1 OR 2
HOW HARD IS IT FOR YOU TO PAY FOR THE VERY BASICS LIKE FOOD, HOUSING, MEDICAL CARE, AND HEATING?: SOMEWHAT HARD
HOW OFTEN DO YOU HAVE SIX OR MORE DRINKS ON ONE OCCASION: NEVER
HOW OFTEN DO YOU GET TOGETHER WITH FRIENDS OR RELATIVES?: NEVER
IN A TYPICAL WEEK, HOW MANY TIMES DO YOU TALK ON THE PHONE WITH FAMILY, FRIENDS, OR NEIGHBORS?: ONCE A WEEK
DO YOU BELONG TO ANY CLUBS OR ORGANIZATIONS SUCH AS CHURCH GROUPS UNIONS, FRATERNAL OR ATHLETIC GROUPS, OR SCHOOL GROUPS?: NO
HOW OFTEN DO YOU ATTEND CHURCH OR RELIGIOUS SERVICES?: NEVER
HOW OFTEN DO YOU GET TOGETHER WITH FRIENDS OR RELATIVES?: NEVER
HOW OFTEN DO YOU ATTENT MEETINGS OF THE CLUB OR ORGANIZATION YOU BELONG TO?: NEVER
HOW OFTEN DO YOU ATTEND CHURCH OR RELIGIOUS SERVICES?: NEVER
DO YOU BELONG TO ANY CLUBS OR ORGANIZATIONS SUCH AS CHURCH GROUPS UNIONS, FRATERNAL OR ATHLETIC GROUPS, OR SCHOOL GROUPS?: NO

## 2024-02-21 ASSESSMENT — LIFESTYLE VARIABLES
HOW MANY STANDARD DRINKS CONTAINING ALCOHOL DO YOU HAVE ON A TYPICAL DAY: 1 OR 2
SKIP TO QUESTIONS 9-10: 1
HOW OFTEN DO YOU HAVE SIX OR MORE DRINKS ON ONE OCCASION: NEVER
HOW OFTEN DO YOU HAVE A DRINK CONTAINING ALCOHOL: 2-3 TIMES A WEEK
AUDIT-C TOTAL SCORE: 3

## 2024-02-22 ENCOUNTER — OFFICE VISIT (OUTPATIENT)
Dept: MEDICAL GROUP | Facility: MEDICAL CENTER | Age: 29
End: 2024-02-22
Payer: COMMERCIAL

## 2024-02-22 VITALS
HEIGHT: 67 IN | DIASTOLIC BLOOD PRESSURE: 68 MMHG | BODY MASS INDEX: 24.77 KG/M2 | WEIGHT: 157.8 LBS | SYSTOLIC BLOOD PRESSURE: 104 MMHG | HEART RATE: 85 BPM | TEMPERATURE: 97.7 F | OXYGEN SATURATION: 95 %

## 2024-02-22 DIAGNOSIS — Z76.89 ENCOUNTER TO ESTABLISH CARE: ICD-10-CM

## 2024-02-22 DIAGNOSIS — L24.0 CONTACT DERMATITIS AND ECZEMA DUE TO DETERGENTS: ICD-10-CM

## 2024-02-22 DIAGNOSIS — F17.200 VAPING NICOTINE DEPENDENCE, NON-TOBACCO PRODUCT: ICD-10-CM

## 2024-02-22 DIAGNOSIS — M54.50 LUMBAR PAIN: ICD-10-CM

## 2024-02-22 PROCEDURE — 99214 OFFICE O/P EST MOD 30 MIN: CPT

## 2024-02-22 PROCEDURE — 3074F SYST BP LT 130 MM HG: CPT

## 2024-02-22 PROCEDURE — 3078F DIAST BP <80 MM HG: CPT

## 2024-02-22 RX ORDER — NYSTATIN 100000 U/G
1 OINTMENT TOPICAL 2 TIMES DAILY
Qty: 15 G | Refills: 0 | Status: SHIPPED | OUTPATIENT
Start: 2024-02-22

## 2024-02-22 ASSESSMENT — ENCOUNTER SYMPTOMS
ORTHOPNEA: 0
COUGH: 0
SHORTNESS OF BREATH: 0
FEVER: 0
PALPITATIONS: 0
CHILLS: 0

## 2024-02-22 ASSESSMENT — PATIENT HEALTH QUESTIONNAIRE - PHQ9
5. POOR APPETITE OR OVEREATING: 1 - SEVERAL DAYS
SUM OF ALL RESPONSES TO PHQ QUESTIONS 1-9: 8
CLINICAL INTERPRETATION OF PHQ2 SCORE: 3

## 2024-02-22 ASSESSMENT — FIBROSIS 4 INDEX: FIB4 SCORE: 0.73

## 2024-02-22 NOTE — PROGRESS NOTES
Verbal consent was acquired by the patient to use [employer] ambient listening note generation during this visit       Subjective:    CC: Establish Care      Patient Logan is a 28 y.o.male patient who presents today: To establish care and discuss contact dermatitis, and low back pain    Allergies: Patient has no known allergies.     Medications:   Current Outpatient Medications:     mupirocin (BACTROBAN) 2 % Ointment, Apply 1 Application topically 2 times a day., Disp: 22 g, Rfl: 0    nystatin (MYCOSTATIN) 424318 UNIT/GM Ointment, Apply 1 g topically 2 times a day., Disp: 15 g, Rfl: 0    History of Present Illness  The patient is here today to establish care. He is accompanied by a female partner.    Contact dermatitis: He got it prescribed a while back for nystatin and mupirocin ointment for skin irritation in his genital area.  He is not having a rash right now. It is clearing up. He went to the urologist and they gave him the prescription.  The skin irritation tends to come and go.  He describes it as having dryness and small cuts on his foreskin. It also travels to his buttock area. It is painful to wipe. They said it could be some type of fungal infection.  He was tested for STIs and everything was negative. It comes and goes. It will last for about 1 or 2 weeks and then it clears up on its own. He will be fine for months and then it will randomly come back.     Low back pain: He has had back pain for a while, symptoms started around March of last year. It has been getting to the point where sometimes he gets sharp pain in his left lower back area. Other than that, it is just soreness and uncomfortable. It is mainly in the muscle area along his spine. It is more so on the left side than the right. It has been gradually getting worse. He denies any injury. He went to urgent care and occupational health a couple of times for his back last year. They told him to do a worker's compensation, but they ended up  "denying it. He got a back brace. It helps a little bit. He took Flexeril, but it made him dizzy. He takes ibuprofen every now and then. He was referred to physical therapy, but they never got it through.        Objective:  /68   Pulse 85   Temp 36.5 °C (97.7 °F) (Temporal)   Ht 1.702 m (5' 7\")   Wt 71.6 kg (157 lb 12.8 oz)   SpO2 95%   BMI 24.71 kg/m² , Body mass index is 24.71 kg/m².    Physical Exam  The patient is alert and oriented.  Pupils are PERRL.  Lungs sounds clear bilaterally.  Normal S1, S2, no S3 or murmur.    ROS:  Review of Systems   Constitutional:  Negative for chills and fever.   Respiratory:  Negative for cough and shortness of breath.    Cardiovascular:  Negative for chest pain, palpitations, orthopnea and leg swelling.             Assessment & Plan  1. Genital irritation.  It might be dermatitis. I will refer him to a dermatologist. I will refill his nystatin and mupirocin ointment. He was advised to switch his detergents, body lotions, and soaps. He can try taking an allergy medication like Zyrtec, Claritin, or Allegra.  - mupirocin (BACTROBAN) 2 % Ointment; Apply 1 Application topically 2 times a day.  Dispense: 22 g; Refill: 0  - nystatin (MYCOSTATIN) 862974 UNIT/GM Ointment; Apply 1 g topically 2 times a day.  Dispense: 15 g; Refill: 0  - Referral to Dermatology    2. Lower back pain.  I will refer him to [facility]. If he does not hear from them in a week, he will let me know.  - Referral to Physical Therapy    3. Vaping nicotine dependence, non-tobacco product  He is vaping every day. He was advised to quit vaping.  We discussed cessation given that his wife is pregnant.  Patient is planning to work towards quitting.    4. Encounter to establish care  Stable    Follow-up  The patient will follow up in 8 weeks.      I spent a total of 22 minutes which included time preparing to see the patient (reviewing my last note, records and recent lab results)  I also " performed a medically appropriate examination, counseled and educated the patient, ordered medications and tests.  Also referred the patient to other healthcare professionals.         This note was created using voice recognition software (Dragon). The accuracy of the dictation is limited by the abilities of the software. I have reviewed the note prior to signing, however some errors in grammar and context are still possible. If you have any questions related to this note please do not hesitate to contact our office.

## 2024-02-27 ENCOUNTER — OFFICE VISIT (OUTPATIENT)
Dept: URGENT CARE | Facility: CLINIC | Age: 29
End: 2024-02-27
Payer: COMMERCIAL

## 2024-02-27 VITALS
OXYGEN SATURATION: 98 % | DIASTOLIC BLOOD PRESSURE: 80 MMHG | BODY MASS INDEX: 24.17 KG/M2 | TEMPERATURE: 98.4 F | RESPIRATION RATE: 12 BRPM | SYSTOLIC BLOOD PRESSURE: 114 MMHG | HEIGHT: 67 IN | WEIGHT: 154 LBS | HEART RATE: 76 BPM

## 2024-02-27 DIAGNOSIS — R11.2 NAUSEA AND VOMITING, UNSPECIFIED VOMITING TYPE: ICD-10-CM

## 2024-02-27 DIAGNOSIS — R19.7 DIARRHEA, UNSPECIFIED TYPE: ICD-10-CM

## 2024-02-27 PROCEDURE — 3079F DIAST BP 80-89 MM HG: CPT | Performed by: PHYSICIAN ASSISTANT

## 2024-02-27 PROCEDURE — 99213 OFFICE O/P EST LOW 20 MIN: CPT | Performed by: PHYSICIAN ASSISTANT

## 2024-02-27 PROCEDURE — 3074F SYST BP LT 130 MM HG: CPT | Performed by: PHYSICIAN ASSISTANT

## 2024-02-27 RX ORDER — ONDANSETRON 4 MG/1
4 TABLET, ORALLY DISINTEGRATING ORAL ONCE
Status: COMPLETED | OUTPATIENT
Start: 2024-02-27 | End: 2024-02-27

## 2024-02-27 RX ORDER — ONDANSETRON 4 MG/1
4 TABLET, ORALLY DISINTEGRATING ORAL EVERY 6 HOURS PRN
Qty: 20 TABLET | Refills: 0 | Status: SHIPPED | OUTPATIENT
Start: 2024-02-27

## 2024-02-27 RX ADMIN — ONDANSETRON 4 MG: 4 TABLET, ORALLY DISINTEGRATING ORAL at 12:15

## 2024-02-27 ASSESSMENT — ENCOUNTER SYMPTOMS
BLOOD IN STOOL: 0
HEADACHES: 1
ABDOMINAL PAIN: 0
CHILLS: 0
SORE THROAT: 0
DIAPHORESIS: 0
CONSTIPATION: 0
SHORTNESS OF BREATH: 0
NAUSEA: 1
WEAKNESS: 0
MYALGIAS: 0
FLANK PAIN: 0
DIARRHEA: 1
FEVER: 0
COUGH: 0
VOMITING: 1
DIZZINESS: 0

## 2024-02-27 ASSESSMENT — FIBROSIS 4 INDEX: FIB4 SCORE: 0.73

## 2024-02-27 NOTE — PROGRESS NOTES
Subjective:     CHIEF COMPLAINT  Chief Complaint   Patient presents with    Nausea     Patient is here today for some nausea, vomiting, headache and diarrhea. Patient states that symptoms started yesterday morning. Patient states that he feels dehydrated and light headed. Still having nausea today.     Headache       HPI  Logan Mason is a very pleasant 28 y.o. male who presents to the clinic with nausea, vomiting and diarrhea starting yesterday.  States he woke up with the symptoms yesterday morning.  States he had a headache and generalized fatigue.  He then had multiple bouts of emesis throughout the morning.  Emesis has since improved.  Continues to feel slightly nauseous today.  He has had approximately 3 episodes of diarrhea per day over the last 2 days.  No blood or mucus in the stool.  No associated abdominal pain.  Has been tolerating small amounts of Gatorade.  No other ill contacts in the house.  No recent travel or antibiotic use.    REVIEW OF SYSTEMS  Review of Systems   Constitutional:  Positive for malaise/fatigue. Negative for chills, diaphoresis and fever.   HENT:  Negative for congestion and sore throat.    Respiratory:  Negative for cough and shortness of breath.    Gastrointestinal:  Positive for diarrhea, nausea and vomiting. Negative for abdominal pain, blood in stool, constipation and melena.   Genitourinary:  Negative for dysuria, flank pain, frequency, hematuria and urgency.   Musculoskeletal:  Negative for myalgias.   Skin:  Negative for rash.   Neurological:  Positive for headaches. Negative for dizziness and weakness.       PAST MEDICAL HISTORY  Patient Active Problem List    Diagnosis Date Noted    Vaping nicotine dependence, non-tobacco product 02/22/2024    Contact dermatitis and eczema due to detergents 02/22/2024    Lumbar spine strain 03/28/2023    Balanoposthitis 06/12/2022    Lumbar pain 10/14/2014       SURGICAL HISTORY  patient denies any surgical history    ALLERGIES  No  "Known Allergies    CURRENT MEDICATIONS  Home Medications       Reviewed by Pablo Mcleod P.A.-C. (Physician Assistant) on 02/27/24 at 1041  Med List Status: <None>     Medication Last Dose Status   mupirocin (BACTROBAN) 2 % Ointment Not Taking Active   nystatin (MYCOSTATIN) 882933 UNIT/GM Ointment Not Taking Active                    SOCIAL HISTORY  Social History     Tobacco Use    Smoking status: Never     Passive exposure: Never    Smokeless tobacco: Never   Vaping Use    Vaping Use: Every day    Substances: THC    Devices: Pre-filled or refillable cartridge   Substance and Sexual Activity    Alcohol use: Yes     Alcohol/week: 6.0 oz     Types: 10 Cans of beer per week     Comment: rare occassions    Drug use: Yes     Frequency: 7.0 times per week     Types: Marijuana, Inhaled     Comment: daily since age [age]    Sexual activity: Yes     Partners: Female     Birth control/protection: None       FAMILY HISTORY  Family History   Problem Relation Age of Onset    Hypertension Mother           Objective:     VITAL SIGNS: /80 (BP Location: Left arm, Patient Position: Sitting, BP Cuff Size: Adult)   Pulse 76   Temp 36.9 °C (98.4 °F) (Temporal)   Resp 12   Ht 1.702 m (5' 7\")   Wt 69.9 kg (154 lb)   SpO2 98%   BMI 24.12 kg/m²     PHYSICAL EXAM  Physical Exam  Constitutional:       General: He is not in acute distress.     Appearance: Normal appearance. He is not ill-appearing, toxic-appearing or diaphoretic.   HENT:      Head: Normocephalic and atraumatic.      Mouth/Throat:      Mouth: Mucous membranes are moist.   Eyes:      Conjunctiva/sclera: Conjunctivae normal.   Cardiovascular:      Rate and Rhythm: Normal rate and regular rhythm.      Pulses: Normal pulses.      Heart sounds: Normal heart sounds.   Pulmonary:      Effort: Pulmonary effort is normal.      Breath sounds: Normal breath sounds.   Abdominal:      General: Bowel sounds are normal. There is no distension.      Palpations: Abdomen is soft. "      Tenderness: There is no abdominal tenderness. There is no right CVA tenderness, left CVA tenderness, guarding or rebound.   Musculoskeletal:      Cervical back: Normal range of motion. No muscular tenderness.   Skin:     General: Skin is warm and dry.      Capillary Refill: Capillary refill takes less than 2 seconds.   Neurological:      General: No focal deficit present.      Mental Status: He is alert and oriented to person, place, and time. Mental status is at baseline.   Psychiatric:         Mood and Affect: Mood normal.         Thought Content: Thought content normal.         Assessment/Plan:     1. Nausea and vomiting, unspecified vomiting type  - ondansetron (ZOFRAN ODT) 4 MG TABLET DISPERSIBLE; Take 1 Tablet by mouth every 6 hours as needed for Nausea/Vomiting.  Dispense: 20 Tablet; Refill: 0  - ondansetron (Zofran ODT) dispertab 4 mg    2. Diarrhea, unspecified type      MDM/Comments:    This patient presents with nausea, vomiting & diarrhea. Differential diagnoses includes possible acute gastroenteritis. Abdominal exam without peritoneal signs. Currently no  evidence of dehydration. No diet changes to suggest food poisoning. No drinking water from possibly contaminated sources. No recent antibiotic use to suggest c. difficile.   No history or suggestion of inflammatory bowel disease, dietary changes, medication changes, irritable bowel syndrome. No evidence of obstruction. No evidence of surgical abdomen or other acute medical emergency including bowel obstruction, viscus perforation, vascular catastrophe, atypical appendicitis, acute cholecystitis at this time. Presentation not consistent with other acute, emergent causes of vomiting / diarrhea at this time. No indication for abdominal imaging. With no blood in the stool and given the timing, no indication for stool studies currently. At present, the diagnosis remains unclear, and patient will closely monitor to look for progression of symptoms  requiring re-evaluation or Emergency Department evaluation.     Differential diagnosis, natural history, supportive care, and indications for immediate follow-up discussed. All questions answered. Patient agrees with the plan of care.    Follow-up as needed if symptoms worsen or fail to improve to PCP, Urgent care or Emergency Room.    I have personally reviewed prior external notes and test results pertinent to today's visit.  I have independently reviewed and interpreted all diagnostics ordered during this urgent care acute visit.   Discussed management options (risks,benefits, and alternatives to treatment). Pt expresses understanding and the treatment plan was agreed upon. Questions were encouraged and answered to pt's satisfaction.    Please note that this dictation was created using voice recognition software. I have made a reasonable attempt to correct obvious errors, but I expect that there are errors of grammar and possibly content that I did not discover before finalizing the note.

## 2024-02-27 NOTE — LETTER
February 27, 2024    To Whom It May Concern:         This is confirmation that Logan Mason attended his scheduled appointment with Pablo Mcleod P.A.-C. on 2/27/24.  Please excuse the patient's absence from work on 2/27/2024 and 2/28/2024.         If you have any questions please do not hesitate to call me at the phone number listed below.    Sincerely,          aPblo Mcleod P.A.-C.  [phone]

## 2024-06-10 ENCOUNTER — OFFICE VISIT (OUTPATIENT)
Dept: URGENT CARE | Facility: CLINIC | Age: 29
End: 2024-06-10
Payer: COMMERCIAL

## 2024-06-10 VITALS
DIASTOLIC BLOOD PRESSURE: 76 MMHG | WEIGHT: 158 LBS | RESPIRATION RATE: 16 BRPM | HEIGHT: 67 IN | BODY MASS INDEX: 24.8 KG/M2 | SYSTOLIC BLOOD PRESSURE: 132 MMHG | TEMPERATURE: 97.8 F | OXYGEN SATURATION: 99 % | HEART RATE: 84 BPM

## 2024-06-10 DIAGNOSIS — M54.42 ACUTE LEFT-SIDED LOW BACK PAIN WITH LEFT-SIDED SCIATICA: ICD-10-CM

## 2024-06-10 PROCEDURE — 3075F SYST BP GE 130 - 139MM HG: CPT | Performed by: NURSE PRACTITIONER

## 2024-06-10 PROCEDURE — 3078F DIAST BP <80 MM HG: CPT | Performed by: NURSE PRACTITIONER

## 2024-06-10 PROCEDURE — 99213 OFFICE O/P EST LOW 20 MIN: CPT | Performed by: NURSE PRACTITIONER

## 2024-06-10 RX ORDER — KETOROLAC TROMETHAMINE 30 MG/ML
30 INJECTION, SOLUTION INTRAMUSCULAR; INTRAVENOUS ONCE
Status: COMPLETED | OUTPATIENT
Start: 2024-06-10 | End: 2024-06-10

## 2024-06-10 RX ORDER — CYCLOBENZAPRINE HCL 5 MG
5 TABLET ORAL 3 TIMES DAILY PRN
Qty: 20 TABLET | Refills: 0 | Status: SHIPPED | OUTPATIENT
Start: 2024-06-10

## 2024-06-10 RX ADMIN — KETOROLAC TROMETHAMINE 30 MG: 30 INJECTION, SOLUTION INTRAMUSCULAR; INTRAVENOUS at 08:48

## 2024-06-10 ASSESSMENT — FIBROSIS 4 INDEX: FIB4 SCORE: 0.73

## 2024-06-10 NOTE — PROGRESS NOTES
Chief Complaint   Patient presents with    Back Pain     Patient is here today for back pain. States that he thinks that he threw his back out. States lower back is in a lot of pain.        HISTORY OF PRESENT ILLNESS: Patient is a pleasant 28 y.o. male who presents today due to one day of low back pain.  Pain started this morning after patient had a coughing fit.  Pain is located to left lower lumbar region with radiation down left leg and across low back.  Denies fever, chills, malaise, hematuria, saddle anesthesia, unilateral weakness, or loss of bowel or bladder.  He has not tried any medication for symptom relief.        Patient Active Problem List    Diagnosis Date Noted    Vaping nicotine dependence, non-tobacco product 02/22/2024    Contact dermatitis and eczema due to detergents 02/22/2024    Lumbar spine strain 03/28/2023    Balanoposthitis 06/12/2022    Lumbar pain 10/14/2014       Allergies:Patient has no known allergies.    Current Outpatient Medications Ordered in Epic   Medication Sig Dispense Refill    cyclobenzaprine (FLEXERIL) 5 mg tablet Take 1 Tablet by mouth 3 times a day as needed for Moderate Pain or Muscle Spasms. 20 Tablet 0     Current Facility-Administered Medications Ordered in Epic   Medication Dose Route Frequency Provider Last Rate Last Admin    ketorolac (Toradol) injection 30 mg  30 mg Intramuscular Once         ketorolac (Toradol) injection 30 mg  30 mg Intramuscular Once            No past medical history on file.    Social History     Tobacco Use    Smoking status: Never     Passive exposure: Never    Smokeless tobacco: Never   Vaping Use    Vaping status: Every Day    Substances: THC    Devices: Pre-filled or refillable cartridge   Substance Use Topics    Alcohol use: Yes     Alcohol/week: 6.0 oz     Types: 10 Cans of beer per week     Comment: rare occassions    Drug use: Yes     Frequency: 7.0 times per week     Types: Marijuana, Inhaled     Comment: daily since age [age]  "      Family Status   Relation Name Status    Ole Bundy Alive    Fa  Alive     Family History   Problem Relation Age of Onset    Hypertension Mother        ROS:  Review of Systems   Constitutional: Negative for fever, chills, weight loss, malaise, and fatigue.   HENT: Negative for ear pain, nosebleeds, congestion, sore throat and neck pain.    Eyes: Negative for vision changes.   Neuro: Negative for headache, sensory changes, weakness, seizure, LOC.   Cardiovascular: Negative for chest pain, palpitations, orthopnea and leg swelling.   Respiratory: Negative for cough, sputum production, shortness of breath and wheezing.   Gastrointestinal: Negative for abdominal pain, nausea, vomiting or diarrhea.   Genitourinary: Negative for dysuria, urgency and frequency.  Musculoskeletal: Positive for low back pain. Negative for falls, neck pain, joint pain, myalgias.   Skin: Negative for rash, diaphoresis.     Exam:  /76   Pulse 84   Temp 36.6 °C (97.8 °F) (Temporal)   Resp 16   Ht 1.702 m (5' 7\")   Wt 71.7 kg (158 lb)   SpO2 99%   General: well-nourished, well-developed male, appears uncomfortable  Head: normocephalic, atraumatic  Eyes: PERRLA, no conjunctival injection, acuity grossly intact, lids normal.  Ears: normal shape and symmetry, no tenderness, no discharge. External canals are without any significant edema or erythema. Tympanic membranes are without any inflammation, no effusion. Gross auditory acuity is intact.  Nose: symmetrical without tenderness, no discharge.  Mouth/Throat: reasonable hygiene, no erythema, exudates or tonsillar enlargement.  Neck: no masses, range of motion within normal limits, no tracheal deviation. No obvious thyroid enlargement.   Lymph: no cervical adenopathy. No supraclavicular adenopathy.   Neuro: alert and oriented. Cranial nerves 1-12 grossly intact. No sensory deficit.   Cardiovascular: regular rate and rhythm. No edema.  Pulmonary: no distress. Chest is symmetrical with " respiration, no wheezes, crackles, or rhonchi.   Musculoskeletal: Lumbar spine: pt exhibits tenderness to left paraspinal region. Pt exhibits no bony tenderness, swelling, spasm, edema or deformity. Patient has normal reflexes, patellar reflexes are 2+ on the right side and 2+ on the left side. Patient exhibits normal muscle tone.  Negative straight leg raise. Gait even and steady. No clubbing.   Skin: warm, dry, intact, no clubbing, no cyanosis, no rashes.   Psych: appropriate mood, affect, judgement.         Assessment/Plan:  1. Acute left-sided low back pain with left-sided sciatica  ketorolac (Toradol) injection 30 mg    ketorolac (Toradol) injection 30 mg    cyclobenzaprine (FLEXERIL) 5 mg tablet          Patient is a pleasant 28-year-old who presents with acute low back pain.  Toradol provided in clinic.  May take Tylenol today for pain relief as well as Flexeril.  Ice and heat therapy, gentle stretching encouraged.  Supportive care, differential diagnoses, and indications for immediate follow-up discussed with patient.   Pathogenesis of diagnosis discussed including typical length and natural progression.   Instructed to return to clinic or nearest emergency department for any change in condition, further concerns, or worsening of symptoms.  Patient states understanding of the plan of care and discharge instructions.  Instructed to make an appointment, for follow up, with his primary care provider.        Please note that this dictation was created using voice recognition software. I have made every reasonable attempt to correct obvious errors, but I expect that there are errors of grammar and possibly content that I did not discover before finalizing the note.      MARIA LUISA Rothman.

## 2024-06-10 NOTE — LETTER
Flor 10, 2024         Patient: Logan Mason   YOB: 1995   Date of Visit: 6/10/2024           To Whom it May Concern:    Logan Mason was seen in my clinic on 6/10/2024. He may be excused from work today and the next two days (if needed).     If you have any questions or concerns, please don't hesitate to call.        Sincerely,           MARIA LUISA Rothman.  Electronically Signed

## 2024-06-12 ENCOUNTER — OFFICE VISIT (OUTPATIENT)
Dept: URGENT CARE | Facility: CLINIC | Age: 29
End: 2024-06-12
Payer: COMMERCIAL

## 2024-06-12 VITALS
HEIGHT: 68 IN | DIASTOLIC BLOOD PRESSURE: 82 MMHG | BODY MASS INDEX: 23.95 KG/M2 | TEMPERATURE: 98 F | WEIGHT: 158 LBS | HEART RATE: 89 BPM | SYSTOLIC BLOOD PRESSURE: 120 MMHG | RESPIRATION RATE: 16 BRPM | OXYGEN SATURATION: 97 %

## 2024-06-12 DIAGNOSIS — S39.012A STRAIN OF LUMBAR REGION, INITIAL ENCOUNTER: ICD-10-CM

## 2024-06-12 PROCEDURE — 3074F SYST BP LT 130 MM HG: CPT | Performed by: PHYSICIAN ASSISTANT

## 2024-06-12 PROCEDURE — 3079F DIAST BP 80-89 MM HG: CPT | Performed by: PHYSICIAN ASSISTANT

## 2024-06-12 PROCEDURE — 99213 OFFICE O/P EST LOW 20 MIN: CPT | Performed by: PHYSICIAN ASSISTANT

## 2024-06-12 ASSESSMENT — ENCOUNTER SYMPTOMS
ABDOMINAL PAIN: 0
FEVER: 0
BACK PAIN: 1
CARDIOVASCULAR NEGATIVE: 1
NAUSEA: 0
HEADACHES: 0
FALLS: 0
NUMBNESS: 0
TINGLING: 0
PARESIS: 0
CONSTITUTIONAL NEGATIVE: 1
PERIANAL NUMBNESS: 0
RESPIRATORY NEGATIVE: 1
VOMITING: 0
DIARRHEA: 0
BOWEL INCONTINENCE: 0
NEUROLOGICAL NEGATIVE: 1

## 2024-06-12 ASSESSMENT — FIBROSIS 4 INDEX: FIB4 SCORE: 0.73

## 2024-06-12 NOTE — LETTER
June 12, 2024         Patient: Logan Mason   YOB: 1995   Date of Visit: 6/12/2024           To Whom it May Concern:    Logan Mason was seen in my clinic on 6/12/2024. He may return to work on Monday June 17th.    If you have any questions or concerns, please don't hesitate to call.        Sincerely,           Sukh Camilo P.A.-C.  Electronically Signed

## 2024-06-13 NOTE — PROGRESS NOTES
Subjective     Logan Mason is a very pleasant 28 y.o. male who presents with Back Pain (Was in a couple days ago for back Px, note  and wants to see if it can be extended, as he is sitll in a lot of pain) and Letter for School/Work (Had a work note but he would like it extended due to the amount of pain he's in and the type of work he does.)            Back Pain  This is a new problem. The current episode started in the past 7 days. The problem occurs constantly. The problem has been gradually improving since onset. The pain is present in the lumbar spine. The quality of the pain is described as aching. The pain does not radiate. The symptoms are aggravated by position and twisting. Pertinent negatives include no abdominal pain, bladder incontinence, bowel incontinence, chest pain, dysuria, fever, headaches, numbness, paresis, perianal numbness or tingling. He has tried NSAIDs, muscle relaxant and bed rest for the symptoms. The treatment provided mild relief.       PMH:  has no past medical history on file.  MEDS:   Current Outpatient Medications:     cyclobenzaprine (FLEXERIL) 5 mg tablet, Take 1 Tablet by mouth 3 times a day as needed for Moderate Pain or Muscle Spasms., Disp: 20 Tablet, Rfl: 0  ALLERGIES: No Known Allergies  SURGHX: No past surgical history on file.  SOCHX:  reports that he has never smoked. He has never been exposed to tobacco smoke. He has never used smokeless tobacco. He reports current alcohol use of about 6.0 oz of alcohol per week. He reports current drug use. Frequency: 7.00 times per week. Drugs: Marijuana and Inhaled.  FH: family history includes Hypertension in his mother.      Review of Systems   Constitutional: Negative.  Negative for fever.   Respiratory: Negative.     Cardiovascular: Negative.  Negative for chest pain.   Gastrointestinal:  Negative for abdominal pain, bowel incontinence, diarrhea, nausea and vomiting.   Genitourinary: Negative.  Negative for bladder  "incontinence and dysuria.   Musculoskeletal:  Positive for back pain. Negative for falls.   Neurological: Negative.  Negative for tingling, numbness and headaches.       Medications, Allergies, and current problem list reviewed today in Epic           Objective     /82 (BP Location: Left arm, Patient Position: Sitting, BP Cuff Size: Adult)   Pulse 89   Temp 36.7 °C (98 °F) (Temporal)   Resp 16   Ht 1.727 m (5' 8\")   Wt 71.7 kg (158 lb)   SpO2 97%   BMI 24.02 kg/m²      Physical Exam  Vitals and nursing note reviewed.   Constitutional:       General: He is not in acute distress.     Appearance: Normal appearance. He is well-developed. He is not diaphoretic.   HENT:      Head: Normocephalic.      Right Ear: Hearing and external ear normal.      Left Ear: Hearing and external ear normal.      Nose: Nose normal.      Mouth/Throat:      Mouth: Mucous membranes are moist.   Eyes:      General:         Right eye: No discharge.         Left eye: No discharge.      Conjunctiva/sclera: Conjunctivae normal.   Cardiovascular:      Rate and Rhythm: Normal rate and regular rhythm.      Pulses: Normal pulses.      Heart sounds: Normal heart sounds.   Pulmonary:      Effort: Pulmonary effort is normal. No respiratory distress.      Breath sounds: Normal breath sounds. No wheezing, rhonchi or rales.   Abdominal:      General: Abdomen is flat. There is no distension.      Palpations: Abdomen is soft.      Tenderness: There is no abdominal tenderness. There is no right CVA tenderness, left CVA tenderness, guarding or rebound.   Musculoskeletal:      Cervical back: Normal range of motion and neck supple.      Lumbar back: Swelling, edema, spasms and tenderness present. No deformity, signs of trauma or bony tenderness. Decreased range of motion. Negative right straight leg raise test and negative left straight leg raise test. No scoliosis.      Right lower leg: No edema.      Left lower leg: No edema.      Comments: Left " lumbar paraspinal muscular TTP radiating into the SI joint and gluteal region.  Localized edema and spasming noted.  No midline or bony tenderness.  No gross deformity, lesions, erythema, or ecchymosis.  Lower extremity strength and DTRs equal.  Gait appropriate.  Forward flexion limited secondary to pain.   Skin:     General: Skin is warm and dry.      Findings: No rash.   Neurological:      General: No focal deficit present.      Mental Status: He is alert and oriented to person, place, and time. Mental status is at baseline.      Motor: No weakness.      Gait: Gait normal.      Deep Tendon Reflexes: Reflexes normal.   Psychiatric:         Mood and Affect: Mood normal.         Behavior: Behavior normal.         Thought Content: Thought content normal.         Judgment: Judgment normal.                             Assessment & Plan        1. Strain of lumbar region, initial encounter          Patient notes symptoms are improving on current regimen.  No new or concerning symptoms.  Vitals normal.  Exam reassuring showing no radicular or red flag findings.  Continue current treatment plan.  New note for work provided.      I personally reviewed prior external notes and test results pertinent to today's visit. Return to clinic or go to ED if symptoms worsen or persist. Red flag symptoms and indications for ED discussed at length. Patient/Parent/Guardian voices understanding.  AVS with post-visit instructions printed and provided or given verbally.  Follow-up with your primary care provider in 3-5 days. All side effects and potential interactions of prescribed medication discussed including allergic response, GI upset, tendon injury, rash, sedation, OCP effectiveness, etc.    Please note that this dictation was created using voice recognition software. I have made every reasonable attempt to correct obvious errors, but I expect that there are errors of grammar and possibly content that I did not discover before  finalizing the note.

## 2024-06-24 ENCOUNTER — TELEPHONE (OUTPATIENT)
Dept: MEDICAL GROUP | Facility: MEDICAL CENTER | Age: 29
End: 2024-06-24
Payer: COMMERCIAL

## 2024-06-24 DIAGNOSIS — M54.50 LUMBAR PAIN: ICD-10-CM

## 2024-10-23 ENCOUNTER — HOSPITAL ENCOUNTER (EMERGENCY)
Facility: MEDICAL CENTER | Age: 29
End: 2024-10-23
Attending: EMERGENCY MEDICINE
Payer: COMMERCIAL

## 2024-10-23 ENCOUNTER — APPOINTMENT (OUTPATIENT)
Dept: RADIOLOGY | Facility: MEDICAL CENTER | Age: 29
End: 2024-10-23
Attending: EMERGENCY MEDICINE
Payer: COMMERCIAL

## 2024-10-23 VITALS
SYSTOLIC BLOOD PRESSURE: 114 MMHG | TEMPERATURE: 97.8 F | DIASTOLIC BLOOD PRESSURE: 78 MMHG | BODY MASS INDEX: 24.74 KG/M2 | OXYGEN SATURATION: 96 % | HEART RATE: 69 BPM | WEIGHT: 157.63 LBS | RESPIRATION RATE: 15 BRPM | HEIGHT: 67 IN

## 2024-10-23 DIAGNOSIS — R42 VERTIGO: ICD-10-CM

## 2024-10-23 DIAGNOSIS — R11.0 NAUSEA: ICD-10-CM

## 2024-10-23 DIAGNOSIS — R51.9 ACUTE NONINTRACTABLE HEADACHE, UNSPECIFIED HEADACHE TYPE: ICD-10-CM

## 2024-10-23 DIAGNOSIS — E86.0 DEHYDRATION: ICD-10-CM

## 2024-10-23 LAB
ALBUMIN SERPL BCP-MCNC: 4.9 G/DL (ref 3.2–4.9)
ALBUMIN/GLOB SERPL: 1.4 G/DL
ALP SERPL-CCNC: 120 U/L (ref 30–99)
ALT SERPL-CCNC: 40 U/L (ref 2–50)
AMPHET UR QL SCN: NEGATIVE
ANION GAP SERPL CALC-SCNC: 12 MMOL/L (ref 7–16)
AST SERPL-CCNC: 27 U/L (ref 12–45)
BARBITURATES UR QL SCN: NEGATIVE
BASOPHILS # BLD AUTO: 0.4 % (ref 0–1.8)
BASOPHILS # BLD: 0.03 K/UL (ref 0–0.12)
BENZODIAZ UR QL SCN: NEGATIVE
BILIRUB SERPL-MCNC: 0.9 MG/DL (ref 0.1–1.5)
BUN SERPL-MCNC: 13 MG/DL (ref 8–22)
BZE UR QL SCN: NEGATIVE
CALCIUM ALBUM COR SERPL-MCNC: 9.3 MG/DL (ref 8.5–10.5)
CALCIUM SERPL-MCNC: 10 MG/DL (ref 8.5–10.5)
CANNABINOIDS UR QL SCN: POSITIVE
CHLORIDE SERPL-SCNC: 103 MMOL/L (ref 96–112)
CO2 SERPL-SCNC: 22 MMOL/L (ref 20–33)
COHGB MFR BLD: 1.5 % (ref 0–4.9)
CREAT SERPL-MCNC: 0.79 MG/DL (ref 0.5–1.4)
EKG IMPRESSION: NORMAL
EKG IMPRESSION: NORMAL
EOSINOPHIL # BLD AUTO: 0.04 K/UL (ref 0–0.51)
EOSINOPHIL NFR BLD: 0.5 % (ref 0–6.9)
ERYTHROCYTE [DISTWIDTH] IN BLOOD BY AUTOMATED COUNT: 38.9 FL (ref 35.9–50)
ETHANOL BLD-MCNC: <10.1 MG/DL
FENTANYL UR QL: NEGATIVE
GFR SERPLBLD CREATININE-BSD FMLA CKD-EPI: 123 ML/MIN/1.73 M 2
GLOBULIN SER CALC-MCNC: 3.4 G/DL (ref 1.9–3.5)
GLUCOSE SERPL-MCNC: 91 MG/DL (ref 65–99)
HCT VFR BLD AUTO: 47.5 % (ref 42–52)
HGB BLD-MCNC: 16.6 G/DL (ref 14–18)
IMM GRANULOCYTES # BLD AUTO: 0.02 K/UL (ref 0–0.11)
IMM GRANULOCYTES NFR BLD AUTO: 0.3 % (ref 0–0.9)
LYMPHOCYTES # BLD AUTO: 1.72 K/UL (ref 1–4.8)
LYMPHOCYTES NFR BLD: 21.7 % (ref 22–41)
MCH RBC QN AUTO: 30.5 PG (ref 27–33)
MCHC RBC AUTO-ENTMCNC: 34.9 G/DL (ref 32.3–36.5)
MCV RBC AUTO: 87.2 FL (ref 81.4–97.8)
METHADONE UR QL SCN: NEGATIVE
MONOCYTES # BLD AUTO: 0.36 K/UL (ref 0–0.85)
MONOCYTES NFR BLD AUTO: 4.5 % (ref 0–13.4)
NEUTROPHILS # BLD AUTO: 5.76 K/UL (ref 1.82–7.42)
NEUTROPHILS NFR BLD: 72.6 % (ref 44–72)
NRBC # BLD AUTO: 0 K/UL
NRBC BLD-RTO: 0 /100 WBC (ref 0–0.2)
OPIATES UR QL SCN: NEGATIVE
OXYCODONE UR QL SCN: NEGATIVE
PCP UR QL SCN: NEGATIVE
PLATELET # BLD AUTO: 252 K/UL (ref 164–446)
PMV BLD AUTO: 9.1 FL (ref 9–12.9)
POTASSIUM SERPL-SCNC: 3.8 MMOL/L (ref 3.6–5.5)
PROPOXYPH UR QL SCN: NEGATIVE
PROT SERPL-MCNC: 8.3 G/DL (ref 6–8.2)
RBC # BLD AUTO: 5.45 M/UL (ref 4.7–6.1)
SODIUM SERPL-SCNC: 137 MMOL/L (ref 135–145)
WBC # BLD AUTO: 7.9 K/UL (ref 4.8–10.8)

## 2024-10-23 PROCEDURE — 82375 ASSAY CARBOXYHB QUANT: CPT

## 2024-10-23 PROCEDURE — 36415 COLL VENOUS BLD VENIPUNCTURE: CPT

## 2024-10-23 PROCEDURE — 82077 ASSAY SPEC XCP UR&BREATH IA: CPT

## 2024-10-23 PROCEDURE — 700117 HCHG RX CONTRAST REV CODE 255: Mod: UD | Performed by: EMERGENCY MEDICINE

## 2024-10-23 PROCEDURE — 85025 COMPLETE CBC W/AUTO DIFF WBC: CPT

## 2024-10-23 PROCEDURE — 70496 CT ANGIOGRAPHY HEAD: CPT

## 2024-10-23 PROCEDURE — 93005 ELECTROCARDIOGRAM TRACING: CPT | Performed by: EMERGENCY MEDICINE

## 2024-10-23 PROCEDURE — 80053 COMPREHEN METABOLIC PANEL: CPT

## 2024-10-23 PROCEDURE — 70498 CT ANGIOGRAPHY NECK: CPT

## 2024-10-23 PROCEDURE — 99285 EMERGENCY DEPT VISIT HI MDM: CPT

## 2024-10-23 PROCEDURE — 700111 HCHG RX REV CODE 636 W/ 250 OVERRIDE (IP): Mod: JZ,UD | Performed by: EMERGENCY MEDICINE

## 2024-10-23 PROCEDURE — 96374 THER/PROPH/DIAG INJ IV PUSH: CPT

## 2024-10-23 PROCEDURE — 70551 MRI BRAIN STEM W/O DYE: CPT

## 2024-10-23 PROCEDURE — 71045 X-RAY EXAM CHEST 1 VIEW: CPT

## 2024-10-23 PROCEDURE — 0042T CT-CEREBRAL PERFUSION ANALYSIS: CPT

## 2024-10-23 PROCEDURE — 80307 DRUG TEST PRSMV CHEM ANLYZR: CPT

## 2024-10-23 RX ORDER — ONDANSETRON 2 MG/ML
4 INJECTION INTRAMUSCULAR; INTRAVENOUS ONCE
Status: COMPLETED | OUTPATIENT
Start: 2024-10-23 | End: 2024-10-23

## 2024-10-23 RX ADMIN — IOHEXOL 40 ML: 350 INJECTION, SOLUTION INTRAVENOUS at 14:43

## 2024-10-23 RX ADMIN — IOHEXOL 80 ML: 350 INJECTION, SOLUTION INTRAVENOUS at 14:44

## 2024-10-23 RX ADMIN — ONDANSETRON 4 MG: 2 INJECTION INTRAMUSCULAR; INTRAVENOUS at 13:04

## 2024-10-23 ASSESSMENT — PAIN DESCRIPTION - PAIN TYPE: TYPE: ACUTE PAIN

## 2024-10-23 ASSESSMENT — FIBROSIS 4 INDEX: FIB4 SCORE: 0.73

## 2024-11-29 ENCOUNTER — OFFICE VISIT (OUTPATIENT)
Dept: URGENT CARE | Facility: CLINIC | Age: 29
End: 2024-11-29
Payer: COMMERCIAL

## 2024-11-29 VITALS
DIASTOLIC BLOOD PRESSURE: 68 MMHG | RESPIRATION RATE: 17 BRPM | HEIGHT: 67 IN | WEIGHT: 160 LBS | HEART RATE: 90 BPM | BODY MASS INDEX: 25.11 KG/M2 | OXYGEN SATURATION: 97 % | SYSTOLIC BLOOD PRESSURE: 110 MMHG | TEMPERATURE: 97.8 F

## 2024-11-29 DIAGNOSIS — K52.9 AGE (ACUTE GASTROENTERITIS): ICD-10-CM

## 2024-11-29 RX ORDER — DICYCLOMINE HCL 20 MG
20 TABLET ORAL EVERY 6 HOURS
Qty: 30 TABLET | Refills: 0 | Status: SHIPPED | OUTPATIENT
Start: 2024-11-29

## 2024-11-29 RX ORDER — ONDANSETRON 4 MG/1
4 TABLET, ORALLY DISINTEGRATING ORAL ONCE
Status: COMPLETED | OUTPATIENT
Start: 2024-11-29 | End: 2024-11-29

## 2024-11-29 RX ORDER — ONDANSETRON 4 MG/1
4 TABLET, ORALLY DISINTEGRATING ORAL EVERY 8 HOURS PRN
Qty: 10 TABLET | Refills: 0 | Status: SHIPPED | OUTPATIENT
Start: 2024-11-29

## 2024-11-29 RX ADMIN — ONDANSETRON 4 MG: 4 TABLET, ORALLY DISINTEGRATING ORAL at 12:54

## 2024-11-29 ASSESSMENT — FIBROSIS 4 INDEX: FIB4 SCORE: 0.51

## 2024-11-29 NOTE — PROGRESS NOTES
"  Chief Complaint   Patient presents with    Emesis     Diarrhea, headache, stomach ache x2 days           Emesis  This is a new problem. The current episode started in the past 7 days. The problem occurs 3 times per day. The problem has been unchanged.  no blood in emesis.  Associated symptoms include bloating. Pertinent negatives include no abdominal pain, chills, coughing, fever, headaches, or weight loss. Nothing aggravates the symptoms. There are no known risk factors. Patient has tried nothing for the symptoms. There is no history of inflammatory bowel disease.     No past medical history on file.    Social History     Tobacco Use    Smoking status: Never     Passive exposure: Never    Smokeless tobacco: Never   Vaping Use    Vaping status: Every Day    Substances: THC    Devices: Pre-filled or refillable cartridge   Substance Use Topics    Alcohol use: Yes     Alcohol/week: 6.0 oz     Types: 10 Cans of beer per week     Comment: rarely    Drug use: Yes     Frequency: 7.0 times per week     Types: Marijuana, Inhaled     Comment: daily since age [age]                  Review of Systems   Constitutional: Negative for fever, chills and weight loss.   Respiratory: Negative for cough and wheezing.    Cardiovascular: Negative for chest pain.   Gastrointestinal:   Negative for  blood in stool.   Neurological: Negative for dizziness and headaches.   All other systems reviewed and are negative.         Objective:     /68   Pulse 90   Temp 36.6 °C (97.8 °F) (Temporal)   Resp 17   Ht 1.702 m (5' 7\")   Wt 72.6 kg (160 lb)   SpO2 97%       Physical Exam   Constitutional: pt is oriented to person, place, and time and appears well-developed. No distress.   HENT:   Head: Normocephalic and atraumatic.   Mouth/Throat: No oropharyngeal exudate.   Eyes: Conjunctivae are normal. No scleral icterus.   Cardiovascular: Normal rate, regular rhythm and normal heart sounds.    Pulmonary/Chest: Effort normal and breath sounds " normal. No respiratory distress. Pt has no wheezes. Pt has no rales.   Abdominal: Normal appearance and bowel sounds are normal. There is no splenomegaly or hepatomegaly. There is no tenderness. There is no rebound, no guarding, no CVA tenderness and no tenderness at McBurney's point.   Lymphadenopathy:     Pt has no cervical adenopathy.   Neurological: pt is alert and oriented to person, place, and time.   Skin: Skin is warm. Pt is not diaphoretic. No erythema.   Psychiatric:  behavior is normal.   Nursing note and vitals reviewed.              Assessment/Plan:         1. Viral gastroenteritis  Able to tolerate PO fluid challenge after Zofran    Push clear fluids at home     BRAT diet x 24 hr  - ondansetron (ZOFRAN) 4 MG Tab tablet; Take 1 Tab by mouth every four hours as needed for Nausea/Vomiting.  Dispense: 20 Tab; Refill: 1       - ondansetron (ZOFRAN ODT) 4 MG TABLET DISPERSIBLE; Take 1 Tablet by mouth every 8 hours as needed for Nausea/Vomiting.  Dispense: 10 Tablet; Refill: 0  - ondansetron (Zofran ODT) dispertab 4 mg  - dicyclomine (BENTYL) 20 MG Tab; Take 1 Tablet by mouth every 6 hours.  Dispense: 30 Tablet; Refill: 0  - omeprazole (PRILOSEC) 20 MG delayed-release capsule; Take 1 Capsule by mouth every day.  Dispense: 30 Capsule; Refill: 0     Differential diagnosis, natural history, supportive care, and indications for immediate follow-up discussed. All questions answered. Patient agrees with the plan of care.     Follow-up as needed if symptoms worsen or fail to improve to PCP, Urgent care or Emergency Room.     I have personally reviewed prior external notes and test results pertinent to today's visit.  I have independently reviewed and interpreted all diagnostics ordered during this urgent care acute visit.

## 2024-11-29 NOTE — LETTER
November 29, 2024         Patient: Logan Mason   YOB: 1995   Date of Visit: 11/29/2024           To Whom it May Concern:    Logan Mason was seen in my clinic on 11/29/2024.     Please excuse absence due to illness on 11/30.       If you have any questions or concerns, please don't hesitate to call.        Sincerely,           Colten Plata M.D.  Electronically Signed

## 2024-11-30 ENCOUNTER — OFFICE VISIT (OUTPATIENT)
Dept: URGENT CARE | Facility: CLINIC | Age: 29
End: 2024-11-30
Payer: COMMERCIAL

## 2024-11-30 ENCOUNTER — HOSPITAL ENCOUNTER (OUTPATIENT)
Facility: MEDICAL CENTER | Age: 29
End: 2024-11-30
Attending: STUDENT IN AN ORGANIZED HEALTH CARE EDUCATION/TRAINING PROGRAM
Payer: COMMERCIAL

## 2024-11-30 VITALS
TEMPERATURE: 97.1 F | HEIGHT: 67 IN | RESPIRATION RATE: 14 BRPM | WEIGHT: 155.5 LBS | BODY MASS INDEX: 24.4 KG/M2 | HEART RATE: 84 BPM | OXYGEN SATURATION: 96 % | DIASTOLIC BLOOD PRESSURE: 72 MMHG | SYSTOLIC BLOOD PRESSURE: 118 MMHG

## 2024-11-30 DIAGNOSIS — R30.0 DYSURIA: ICD-10-CM

## 2024-11-30 LAB
APPEARANCE UR: CLEAR
BILIRUB UR STRIP-MCNC: NEGATIVE MG/DL
COLOR UR AUTO: NORMAL
GLUCOSE UR STRIP.AUTO-MCNC: NEGATIVE MG/DL
KETONES UR STRIP.AUTO-MCNC: NEGATIVE MG/DL
LEUKOCYTE ESTERASE UR QL STRIP.AUTO: NEGATIVE
NITRITE UR QL STRIP.AUTO: NEGATIVE
PH UR STRIP.AUTO: 6 [PH] (ref 5–8)
PROT UR QL STRIP: NEGATIVE MG/DL
RBC UR QL AUTO: NORMAL
SP GR UR STRIP.AUTO: 1.02
UROBILINOGEN UR STRIP-MCNC: NORMAL MG/DL

## 2024-11-30 PROCEDURE — 87086 URINE CULTURE/COLONY COUNT: CPT

## 2024-11-30 RX ORDER — NITROFURANTOIN 25; 75 MG/1; MG/1
100 CAPSULE ORAL 2 TIMES DAILY
Qty: 10 CAPSULE | Refills: 0 | Status: SHIPPED | OUTPATIENT
Start: 2024-11-30 | End: 2024-12-05

## 2024-11-30 ASSESSMENT — ENCOUNTER SYMPTOMS
NAUSEA: 1
CONSTITUTIONAL NEGATIVE: 1
VOMITING: 1
CONSTIPATION: 0
RESPIRATORY NEGATIVE: 1
DIARRHEA: 1
FLANK PAIN: 0
CARDIOVASCULAR NEGATIVE: 1

## 2024-11-30 ASSESSMENT — FIBROSIS 4 INDEX: FIB4 SCORE: 0.51

## 2024-11-30 NOTE — PROGRESS NOTES
Subjective:   Logan Mason is a 29 y.o. male who presents for Other (X last night started with burning when urinating and blood in urine. Was in UC x1 day ago due to food poisoning and dehydration. )      HPI:  29-year-old male who presents with new symptoms of dysuria, burning with urination and noticed a jerardo of blood in his urine x 1 day.  He was here 1 day ago for evaluation for acute gastritis, nausea vomiting and diarrhea.  He reports that the nausea vomiting diarrhea has significantly improved and he is feeling better still feels dehydrated but noticed this morning when he went to the bathroom burning with urination and small amount of blood at the end of his urinary stream.  He denies any pain with defecation.  He denies any urethral discharge or irritation when not urinating.  He does report increased urinary frequency and urgency.    Denies any fevers or chills denies any new onset of flank pain or back pain.  Denies any testicular pain.    Review of Systems   Constitutional: Negative.    HENT: Negative.     Respiratory: Negative.     Cardiovascular: Negative.    Gastrointestinal:  Positive for diarrhea, nausea and vomiting. Negative for constipation.   Genitourinary:  Positive for dysuria, frequency, hematuria and urgency. Negative for flank pain.       Medications:    cyclobenzaprine  dicyclomine Tabs  meloxicam  methylPREDNISolone Tbpk  nitrofurantoin Caps  omeprazole  ondansetron Tbdp    Allergies: Patient has no known allergies.    Problem List: Logan Mason does not have any pertinent problems on file.    Surgical History:  No past surgical history on file.    Past Social Hx: Logan Mason  reports that he has never smoked. He has never been exposed to tobacco smoke. He has never used smokeless tobacco. He reports current alcohol use of about 6.0 oz of alcohol per week. He reports current drug use. Frequency: 7.00 times per week. Drugs: Marijuana and Inhaled.     Past Family Hx:  Logan Mason  "family history includes Hypertension in his mother.     Problem list, medications, and allergies reviewed by myself today in Epic.     Objective:     /72   Pulse 84   Temp 36.2 °C (97.1 °F) (Temporal)   Resp 14   Ht 1.702 m (5' 7\")   Wt 70.5 kg (155 lb 8 oz)   SpO2 96%   BMI 24.35 kg/m²     Physical Exam  Constitutional:       Appearance: Normal appearance.   HENT:      Head: Normocephalic and atraumatic.   Cardiovascular:      Rate and Rhythm: Normal rate and regular rhythm.      Pulses: Normal pulses.      Heart sounds: Normal heart sounds.   Pulmonary:      Effort: Pulmonary effort is normal.      Breath sounds: Normal breath sounds.   Abdominal:      General: Abdomen is flat.      Tenderness: There is no right CVA tenderness, left CVA tenderness or guarding.   Neurological:      Mental Status: He is alert.         Assessment/Plan:     Diagnosis and associated orders:     1. Dysuria  POCT Urinalysis    URINE CULTURE(NEW)    nitrofurantoin (MACROBID) 100 MG Cap         Comments/MDM:     1. Dysuria  Patient without any abdominal or CVA tenderness.  Urinalysis with trace blood no nitrites and leuks.  Will send for urine culture at this time given recent GI illness and diarrhea possibility of UTI slightly increased.  - Will treat as acute cystitis currently with Macrobid twice daily for 5 days.  - Patient to return to clinic if any worsening abdominal pain flank pain development of fever or chills.  - Offered additional testing today but patient declined will work on assumption of acute cystitis if no improvements on current treatment will return for more thorough diagnoses and testing for urethral redness or prostatitis the patient has no symptoms other than increased urgency frequency and slight hematuria.  - We discussed the possibility given his recent dehydration of kidney stones patient has no flank pain at this time.  If he has worsening flank pain or significant blood in his urine he should " present to the emergency department.  Patient is agreeable with discharge plan at this time.  - POCT Urinalysis  - URINE CULTURE(NEW); Future  - nitrofurantoin (MACROBID) 100 MG Cap; Take 1 Capsule by mouth 2 times a day for 5 days.  Dispense: 10 Capsule; Refill: 0           Differential diagnosis, natural history, supportive care, and indications for immediate follow-up discussed.    Advised the patient to follow-up with the primary care physician for recheck, reevaluation, and consideration of further management.    Please note that this dictation was created using voice recognition software. I have made a reasonable attempt to correct obvious errors, but I expect that there are errors of grammar and possibly content that I did not discover before finalizing the note.    Logan Shah M.D.

## 2024-12-02 LAB
BACTERIA UR CULT: NORMAL
SIGNIFICANT IND 70042: NORMAL
SITE SITE: NORMAL
SOURCE SOURCE: NORMAL

## 2024-12-17 ENCOUNTER — APPOINTMENT (OUTPATIENT)
Dept: MEDICAL GROUP | Facility: MEDICAL CENTER | Age: 29
End: 2024-12-17
Payer: COMMERCIAL

## 2024-12-17 VITALS
TEMPERATURE: 98 F | BODY MASS INDEX: 24.33 KG/M2 | HEIGHT: 67 IN | HEART RATE: 103 BPM | OXYGEN SATURATION: 98 % | WEIGHT: 155 LBS | SYSTOLIC BLOOD PRESSURE: 102 MMHG | DIASTOLIC BLOOD PRESSURE: 60 MMHG | RESPIRATION RATE: 16 BRPM

## 2024-12-17 DIAGNOSIS — R09.82 POSTNASAL DRIP: ICD-10-CM

## 2024-12-17 DIAGNOSIS — I70.0 ATHEROSCLEROSIS OF AORTA (HCC): ICD-10-CM

## 2024-12-17 DIAGNOSIS — R09.81 NASAL CONGESTION: ICD-10-CM

## 2024-12-17 DIAGNOSIS — Z11.59 NEED FOR HEPATITIS C SCREENING TEST: ICD-10-CM

## 2024-12-17 DIAGNOSIS — I65.29 ATHEROSCLEROSIS OF COMMON CAROTID ARTERY: ICD-10-CM

## 2024-12-17 DIAGNOSIS — J02.9 SORE THROAT: ICD-10-CM

## 2024-12-17 DIAGNOSIS — Z11.4 ENCOUNTER FOR SCREENING FOR HIV: ICD-10-CM

## 2024-12-17 PROCEDURE — 3074F SYST BP LT 130 MM HG: CPT

## 2024-12-17 PROCEDURE — 3078F DIAST BP <80 MM HG: CPT

## 2024-12-17 PROCEDURE — 99214 OFFICE O/P EST MOD 30 MIN: CPT

## 2024-12-17 ASSESSMENT — ENCOUNTER SYMPTOMS
SHORTNESS OF BREATH: 0
PALPITATIONS: 0
ORTHOPNEA: 0
COUGH: 0
FEVER: 0
CHILLS: 0

## 2024-12-17 ASSESSMENT — FIBROSIS 4 INDEX: FIB4 SCORE: 0.51

## 2024-12-17 NOTE — PROGRESS NOTES
"Subjective:     Verbal consent was acquired by the patient to use [employer] ambient listening note generation during this visit Yes    CC: Follow-Up and Dizziness (10/2024 at work )      HPI:   Logan is a 29 y.o. male who presents today for:    History of Present Illness  The patient presents for a follow-up from an ER visit.    He experienced a work-related incident at the end of October 2024, which necessitated a trip to the emergency room for dizziness.  They did several test to rule out CVA.  While discussing the results with the ERP, he was informed that he has the presence of plaque accumulation in his aorta and bilateral carotid arteries (less than 50% without stenosis), indicative of early-stage cardiovascular disease. A referral to a cardiologist was recommended for further evaluation. He has not previously undergone any cholesterol level assessments.  He is not currently on a statin.    He reported the onset of postnasal drip and mild throat irritation last night. He attributes these symptoms to the absence of a humidifier in his home for approximately one week, following its malfunction.  He states that than the symptoms, he feels fine.         Allergies: Patient has no known allergies.     Medications: No current outpatient medications on file.      ROS:  Review of Systems   Constitutional:  Negative for chills and fever.   Respiratory:  Negative for cough and shortness of breath.    Cardiovascular:  Negative for chest pain, palpitations, orthopnea and leg swelling.       Objective:     Exam:  /60   Pulse (!) 103   Temp 36.7 °C (98 °F)   Resp 16   Ht 1.702 m (5' 7\")   Wt 70.3 kg (155 lb)   SpO2 98%   BMI 24.28 kg/m²  Body mass index is 24.28 kg/m².    Physical Exam  Constitutional:       Appearance: He is normal weight.   Eyes:      Pupils: Pupils are equal, round, and reactive to light.   Cardiovascular:      Rate and Rhythm: Normal rate and regular rhythm.      Pulses: Normal pulses.    "   Heart sounds: Normal heart sounds.   Pulmonary:      Effort: Pulmonary effort is normal.      Breath sounds: Normal breath sounds.   Neurological:      Mental Status: He is alert and oriented to person, place, and time.   Psychiatric:         Mood and Affect: Mood normal.         Behavior: Behavior normal.           Assessment & Plan:     Logan hernandez 29 y.o. male with the following -     Assessment & Plan      1. Atherosclerosis of aorta (HCC)  2. Atherosclerosis of common carotid artery  Chronic, stable  The patient denies chest pain or pressure, or shortness of breath.  The patient has been diagnosed with early-stage cardiovascular disease, as evidenced by the presence of plaque in his carotid arteries and aorta. This condition is concerning given his age. A referral to a cardiologist will be initiated for further evaluation. Additionally, a coronary calcium score will be ordered to assess the extent of arterial plaque. Blood tests, including cholesterol levels and lipoprotein A, will be conducted. He has been advised to fast for 8 hours prior to these tests but may consume water and take medications as usual. If cholesterol levels are elevated, initiation of cholesterol-lowering medication may be considered.  - Lipid Profile; Future  - REFERRAL TO CARDIOLOGY  - Lipoprotein (a); Future  - CT-CARDIAC SCORING; Future    3. Nasal congestion  4. Postnasal drip  5. Sore throat  Acute, uncomplicated  The patient's symptoms of postnasal drip and throat irritation are likely due to the absence of a humidifier in his home, which he has been without for approximately one week. Over-the-counter medications such as Zyrtec, Claritin, or Allegra have been recommended to alleviate sinus congestion. The use of a saline rinse or neti pot has also been suggested. He has been advised to consume warm tea with honey to soothe his throat during the recovery process. Should his symptoms persist despite these interventions, he is to  return for further evaluation.    6.  Encounter for screening for HIV  - HIV AG/AB COMBO ASSAY SCREENING; Future    7. Need for hepatitis C screening test  - HEP C VIRUS ANTIBODY; Future        Anticipatory guidance included the following: Patient counseled about skin care, diet, supplements, smoking, drugs/alcohol use, safe sex and exercise.     Return if symptoms worsen or fail to improve.    Please note that this dictation was created using voice recognition software. I have made every reasonable attempt to correct obvious errors, but I expect that there are errors of grammar and possibly content that I did not discover before finalizing the note.

## 2024-12-27 ENCOUNTER — APPOINTMENT (OUTPATIENT)
Dept: RADIOLOGY | Facility: MEDICAL CENTER | Age: 29
End: 2024-12-27
Payer: COMMERCIAL

## 2025-01-03 ENCOUNTER — TELEPHONE (OUTPATIENT)
Dept: HEALTH INFORMATION MANAGEMENT | Facility: OTHER | Age: 30
End: 2025-01-03
Payer: COMMERCIAL

## 2025-01-10 ENCOUNTER — HOSPITAL ENCOUNTER (OUTPATIENT)
Dept: LAB | Facility: MEDICAL CENTER | Age: 30
End: 2025-01-10
Payer: COMMERCIAL

## 2025-01-10 ENCOUNTER — HOSPITAL ENCOUNTER (OUTPATIENT)
Dept: RADIOLOGY | Facility: MEDICAL CENTER | Age: 30
End: 2025-01-10
Payer: COMMERCIAL

## 2025-01-10 DIAGNOSIS — I70.0 ATHEROSCLEROSIS OF AORTA (HCC): ICD-10-CM

## 2025-01-10 DIAGNOSIS — Z11.4 ENCOUNTER FOR SCREENING FOR HIV: ICD-10-CM

## 2025-01-10 DIAGNOSIS — Z11.59 NEED FOR HEPATITIS C SCREENING TEST: ICD-10-CM

## 2025-01-10 DIAGNOSIS — I65.29 ATHEROSCLEROSIS OF COMMON CAROTID ARTERY: ICD-10-CM

## 2025-01-10 LAB
CHOLEST SERPL-MCNC: 191 MG/DL (ref 100–199)
FASTING STATUS PATIENT QL REPORTED: NORMAL
HCV AB SER QL: NORMAL
HDLC SERPL-MCNC: 61 MG/DL
HIV 1+2 AB+HIV1 P24 AG SERPL QL IA: NORMAL
LDLC SERPL CALC-MCNC: 123 MG/DL
TRIGL SERPL-MCNC: 33 MG/DL (ref 0–149)

## 2025-01-10 PROCEDURE — 87389 HIV-1 AG W/HIV-1&-2 AB AG IA: CPT

## 2025-01-10 PROCEDURE — 86803 HEPATITIS C AB TEST: CPT

## 2025-01-10 PROCEDURE — 36415 COLL VENOUS BLD VENIPUNCTURE: CPT

## 2025-01-10 PROCEDURE — 4410556 CT-CARDIAC SCORING (SELF PAY ONLY)

## 2025-01-10 PROCEDURE — 80061 LIPID PANEL: CPT

## 2025-01-10 PROCEDURE — 83695 ASSAY OF LIPOPROTEIN(A): CPT

## 2025-01-12 LAB — LPA SERPL-MCNC: <6 MG/DL

## 2025-01-17 ENCOUNTER — TELEPHONE (OUTPATIENT)
Dept: CARDIOLOGY | Facility: MEDICAL CENTER | Age: 30
End: 2025-01-17
Payer: COMMERCIAL

## 2025-01-17 NOTE — TELEPHONE ENCOUNTER
Chart Prep     Spoke to patient in regards to records for NP appointment with  on 01/23/2025. Patient has never been treated by a cardiologist, all recent records in epic including labs, imaging and cardiac testing. Confirmed appointment date, time and location.

## 2025-01-21 ENCOUNTER — TELEPHONE (OUTPATIENT)
Dept: CARDIOLOGY | Facility: MEDICAL CENTER | Age: 30
End: 2025-01-21
Payer: COMMERCIAL

## 2025-01-21 NOTE — TELEPHONE ENCOUNTER
Phone Number Called: [phone]    Call outcome: Did not leave a detailed message. Requested patient to call back.    Message: Called to discuss LS message    Giancarlo-Parkinson-White (WPW) syndrome is a heart condition that causes episodes of rapid heart rate. It's caused by an extra electrical pathway in the heart that allows electrical signals to pass between the heart's upper and lower chambers.

## 2025-01-21 NOTE — TELEPHONE ENCOUNTER
----- Message from Physician Sondra Mac M.D. sent at 1/20/2025  7:54 PM PST -----  Regarding: New patient, abnormal ECG  [place] Hoyt please call this patient and introduced cardiology.  He is referred to us to discuss atherosclerosis of the aorta.  I was doing chart prep and I noticed he has an abnormal ECG consistent with [id number].  Please let him know I will explain in person but please do ask him to make sure he makes it to the appointment.  Thank you.

## 2025-01-21 NOTE — PROGRESS NOTES
Medical decision making:  -ECG is classic for WPW with preexcitation.  His symptoms are completely consistent with an episode of most likely A-fib with RVR.  -Referring him to EP for ablation which is highly recommended.    -Pill in pocket flecainide.  -Echocardiogram pending.  -I have asked him to reduce his caffeine intake significantly.  -I have asked him not to inhale anything as it drives inflammation.  -Lipoprotein a is normal and calcium score is normal so I do not think he needs to start a statin for atherosclerosis in the aorta until he is the age of 39, unless he continues to smoke or develops a family history.  This can be managed with his PCP.  -I will follow-up on his echocardiogram and he can return to general cardiology as needed when he is finished with EP.    Problem list:  1. WPW (Giancarlo-Parkinson-White syndrome)  - EC-ECHOCARDIOGRAM COMPLETE W/O CONT; Future  - REFERRAL TO CARDIOLOGY  - flecainide (TAMBOCOR) 50 MG tablet; Take 1 Tablet by mouth see administration instructions. 1 to 2 tablets every 12 hours as needed for palpitations  Dispense: 20 Tablet; Refill: 0    2. Caffeine abuse (HCC)    3. Cannabis abuse    4. Pure hypercholesterolemia    5. Atherosclerosis of aorta (HCC)    6. Palpitations  - EC-ECHOCARDIOGRAM COMPLETE W/O CONT; Future     Chief Complaint:   Chief Complaint   Patient presents with    New Patient     Per referral:  - Atherosclerosis of aorta (HCC)  - Atherosclerosis of common carotid artery       History of Present Illness:  Logan Mason is a 29 y.o. male who is referred by MISTY Manuel for atherosclerosis of the aorta and abnormal ECG, caffeine overuse, hyperlipidemia.    Was at work, underneath a car, came in from the car, stood up, very lightheaded, dizzy, nausea, short of breath, heart racing with chest pressure, had to sit down, felt like he was going to pass out.  Seem to last 30 to 45 minutes.  Urgent care sent him to the ED, had a stroke evaluation.   Was discharge from the hospital without much information, back to [facility] which is part of her [employer], told about atherosclerosis and refer to cardiology.  This has not happened before or since.    Admitted to the emergency room October 2020 for for some dizziness, stroke rule out.  It was commented he had plaque accumulation on his aorta and bilateral carotid arteries.  I personally did not see plaque in the aorta.  Calcium score was 0.    Caffeine overuse, sounds like easily 10 to 12 units daily.    Recreation cannabis, inhaling.    Hyperlipidemia, .  HDL and triglycerides normal.  Normal Lipoprotein A.    No prior hypertension.  No family history of premature coronary artery disease.  Mother has hypertension.  No prior smoking history.  No history of diabetes.  No history of autoimmune disease such as lupus or rheumatoid arthritis.  No history of chest radiation or cardiotoxic chemotherapy.  No chronic kidney disease.  No ETOH overuse.  No hx asthma.  Covid, recovered 100%.    Otherwise when he is feeling well, no issues.  Not limited by chest pain, pressure or tightness with activity.  No significant dyspnea on exertion, orthopnea or lower extremity swelling.  No significant palpitations, lightheadedness, or presyncope/syncope.  No symptoms of leg claudication.  No stroke/TIA like symptoms.    Lives in [place].   to Martha Kramer who is here today with their son.     Wt Readings from Last 5 Encounters:   01/23/25 68.5 kg (151 lb)   12/17/24 70.3 kg (155 lb)   11/30/24 70.5 kg (155 lb 8 oz)   11/29/24 72.6 kg (160 lb)   10/23/24 71.5 kg (157 lb 10.1 oz)       DATA REVIEWED by me:  ECG (my personal interpretation)  10/23/2024 sinus, 61, intermittent preexcitation  10/23/2024 sinus, 63, preexcitation    Echo  Pending    CAC  10/1/2025  Coronary calcification:  LMA - 0.0  LCX - 0.0  LAD - 0.0  RCA - 0.0  Total Calcium Score: 0.0    MRI brain  10/23/2024  Unremarkable    CTA head and  "neck 10/23/2024  CT angiogram within normal limits  Atherosclerotic plaque in the left internal carotid artery and right internal carotid artery and plaque in the aorta      Most recent labs:       Lab Results   Component Value Date/Time    WBC 7.9 10/23/2024 01:09 PM    HEMOGLOBIN 16.6 10/23/2024 01:09 PM    HEMATOCRIT 47.5 10/23/2024 01:09 PM    MCV 87.2 10/23/2024 01:09 PM      Lab Results   Component Value Date/Time    SODIUM 137 10/23/2024 01:09 PM    POTASSIUM 3.8 10/23/2024 01:09 PM    CHLORIDE 103 10/23/2024 01:09 PM    CO2 22 10/23/2024 01:09 PM    GLUCOSE 91 10/23/2024 01:09 PM    BUN 13 10/23/2024 01:09 PM    CREATININE 0.79 10/23/2024 01:09 PM      Lab Results   Component Value Date/Time    ASTSGOT 27 10/23/2024 01:09 PM    ALTSGPT 40 10/23/2024 01:09 PM    ALBUMIN 4.9 10/23/2024 01:09 PM      Lab Results   Component Value Date/Time    CHOLSTRLTOT 191 01/10/2025 09:06 AM     (H) 01/10/2025 09:06 AM    HDL 61 01/10/2025 09:06 AM    TRIGLYCERIDE 33 01/10/2025 09:06 AM     No results for input(s): \"NTPROBNP\", \"TROPONINT\" in the last 72 hours.      History reviewed. No pertinent past medical history.  History reviewed. No pertinent surgical history.  Family History   Problem Relation Age of Onset    Hypertension Mother      Social History     Socioeconomic History    Marital status:      Spouse name: Martha    Number of children: 0    Years of education: Not on file    Highest education level: 12th grade   Occupational History    Not on file   Tobacco Use    Smoking status: Never     Passive exposure: Never    Smokeless tobacco: Never   Vaping Use    Vaping status: Every Day    Substances: THC    Devices: Pre-filled or refillable cartridge   Substance and Sexual Activity    Alcohol use: Yes     Alcohol/week: 6.0 oz     Types: 10 Cans of beer per week     Comment: rarely    Drug use: Yes     Frequency: 7.0 times per week     Types: Marijuana, Inhaled     Comment: daily since age [age]    Sexual " activity: Yes     Partners: Female     Birth control/protection: None   Other Topics Concern    Not on file   Social History Narrative    Not on file     Social Drivers of Health     Financial Resource Strain: Medium Risk (2/21/2024)    Overall Financial Resource Strain (CARDIA)     Difficulty of Paying Living Expenses: Somewhat hard   Food Insecurity: Food Insecurity Present (2/21/2024)    Hunger Vital Sign     Worried About Running Out of Food in the Last Year: Sometimes true     Ran Out of Food in the Last Year: Never true   Transportation Needs: No Transportation Needs (2/21/2024)    PRAPARE - Transportation     Lack of Transportation (Medical): No     Lack of Transportation (Non-Medical): No   Physical Activity: Sufficiently Active (2/21/2024)    Exercise Vital Sign     Days of Exercise per Week: 4 days     Minutes of Exercise per Session: 60 min   Stress: No Stress Concern Present (2/21/2024)    [employer] [place] [employer] - Occupational Stress Questionnaire     Feeling of Stress : Not at all   Social Connections: Socially Isolated (2/21/2024)    Social Connection and Isolation Panel [NHANES]     Frequency of Communication with Friends and Family: Once a week     Frequency of Social Gatherings with Friends and Family: Never     Attends Buddhist Services: Never     Active Member of Clubs or Organizations: No     Attends Club or Organization Meetings: Never     Marital Status:    Intimate Partner Violence: Not on file   Housing Stability: High Risk (2/21/2024)    Housing Stability Vital Sign     Unable to Pay for Housing in the Last Year: Yes     Number of Places Lived in the Last Year: 1     Unstable Housing in the Last Year: No     No Known Allergies    Current Outpatient Medications   Medication Sig Dispense Refill    flecainide (TAMBOCOR) 50 MG tablet Take 1 Tablet by mouth see administration instructions. 1 to 2 tablets every 12 hours as needed for palpitations 20 Tablet 0     No  "current facility-administered medications for this visit.       ROS  All others systems reviewed and negative.    /66 (BP Location: Left arm, Patient Position: Sitting, BP Cuff Size: Adult)   Pulse (!) 105   Resp 14   Ht 1.702 m (5' 7\")   Wt 68.5 kg (151 lb)   SpO2 95%  Body mass index is 23.65 kg/m².    General: No acute distress. Well nourished.  HEENT: EOM grossly intact, no scleral icterus, no pharyngeal erythema.   Neck:  No JVD, no bruits, trachea midline  CVS: RRR. Normal S1, S2. No M/R/G. No LE edema.  2+ radial pulses, 2+ PT pulses  Resp: CTAB. No wheezing or crackles/rhonchi. Normal respiratory effort.  Abdomen: Soft, NT, no marquez hepatomegaly.  MSK/Ext: No clubbing or cyanosis.  Skin: Warm and dry, no rashes.  Neurological: CN III-XII grossly intact. No focal deficits.   Psych: A&O x 3, appropriate affect, good judgement      Return if symptoms worsen or fail to improve.    Written instructions given today:      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  This can be completely cured with ablation which is the procedure of choice.  I am referring you to a specialist cardiologist known as an electrophysiologist cardiologist to get this done.  My team will make an appointment with Dr. Gerardo Florence.    -I am giving you 10 tablets of a medicine called flecainide 50 mg.  If you go back into this electrical situation again, you can pop the tablet in your mouth and it should break it.    -Typically I start statin therapy when people have elevated cholesterol and/or her smoking but I wait till the age of 40.  I do not think we need to start cholesterol in your 30s for atherosclerosis of the aorta.  -I typically start statins if you have an elevated lipoprotein a or an abnormal calcium score or strong family history, you have none of these.  -However, if you choose to go on statin therapy, that is fine and can be done with " your PCP.    Primary prevention of heart attack and stroke:    Most people over 70 have some degree of calcified heart disease that is not clinically relevant. Unfortunately, placing a stent over heart disease that is not clinically relevant does not reduce mortality.  We cannot bypass blockages that are not 70% or more because the bypass grafts will not mature and will be lost.  Blockages in the heart artery should only have a stent or bypass if they are more than 70% blocked and causing symptoms (at which point most people have symptoms such as chest pain or unusual shortness of breath with activity that goes away with rest).  People having a sudden heart attack should have a stent placed in the blocked artery.  People having chest pain that limits their ability to function could consider having a stent placed in the artery.  Otherwise, medication and lifestyle changes are the preferred treatments and are very powerful.    Lifestyle and medications are typically more important than stents or bypass outside of a medical emergency.    The only things to do for calcified heart disease to lower your risk of sudden heart attack (or stroke which is the same disease but a different location- in the brain):  -Keep LDL cholesterol under 100 (or under 70 if you have already had a heart attack/stroke or documented vascular disease).  -If appropriate, take a statin medication (as a vascular medication, not just a cholesterol medication) which is our most powerful weapon to stabilize the plaques and reduce inflammation making them less likely to rupture open and cause a sudden heart attack/stroke.  -Control blood pressure, under 130/80, ideally closer to 120/80.  -Control blood sugar, don't get diabetes.  -Don't smoke.  -Eat a heart healthy diet that reduces inflammation: Pritikin, Mediterranean, Vegetarian, Vegan  -Get regular exercise: 150 minutes of moderate exercise OR 75 minutes of very vigorous exercise every  "week.  -Keep your body mass index under 30, ideal BMI is 20-25.  -Find ricco in life, manage stress, develop close/meaningful personal relationships (proven to help people live longer)  -Know the signs and symptoms of heart attack and stroke and when to call 9-1-1.    Signs of a heart attack: Anything very intense coming from the chest that lasts more than 15 minutes, consider calling 911.  -Pain or pressure in the chest that is intense, lasts more than 15 minutes, not explained by other known reasons such as known/similar heartburn  -It can feel like severe heart burn but associated with nausea, vomiting  -It can be vague pain that radiates to the neck, jaw, shoulders, arm/arms, wrap around your back or even cause a toothache  -Can be associated with unusual shortness of breath at rest or sense of impending doom  *If you think you are having a heart attack, chew up 4 baby aspirin (81 mg) and call 911.    Signs of a stroke: sudden inability to talk, smile on one side, confusion, inability to move arm/leg on one side, numbness/tingling of arm/leg on one side that is not typical. \"Think FAST.\"  F=face drooping  A=arm weakness  S=Speech difficulty  T= time to call 911 (do not give ride to someone, call ambulance to alert the hospital to start stroke protocol)    Please look into the following food lifestyles:  Mediterranean diet.  Pritikin - used at [facility], probably one of the best for anti-inflammatory  Vegan/Vegetarian diet.  DASH diet.    Resources to learn more:  -American Heart Association  -Www.Cardiosmart.org, sponsored by the American College of cardiology.    Regarding statins:  -There is a lot of misinformation about statins.  -It is incredibly rare to have a debilitating reaction affecting the muscles.  -Some people have achy muscles without damage (this occurs in 5 to 6% of the population).  -If statin therapy is right for you and you cannot tolerate 1 statin, we have many different " statins to try.  -Sometimes we find a reversible cause of statin intolerance such as vitamin D deficiency or co-Q10 deficiency.  -Keep working with your provider until you find a dose and brand of statin therapy that you can take without having any side effects.  -Side effects can also include abnormal liver testing which does not lead to permanent liver damage and sometimes we need to keep patients on statins and tolerate mildly abnormal liver function testing.  -For some people, blood sugars increase slightly on statins.  1 in 500 people with pre-diabetes become diabetic 6 months faster.  -Statins do not cause Alzheimer's  -If someone makes it to 75 without a reason to be on a statin for primary prevention then it is not necessary to start statin therapy.    Statin holiday:  Your cholesterol medication can cause muscle aches/sore joints but sometimes it can be difficult to know if it is happening.  Stop your statin therapy for 1 week, note if you feel better, resume the therapy, note if you feel worse again.  Report back to me if you think your statin is making you feel worse.      Calcium Scores-AKA the tie breaker for statins-your calcium score is 0 so I am not recommending statins yet::    A calcium score is a low radiation dose CT scan to look for calcification of the heart arteries.  It is a screening tool to help assess cardiovascular risk.  It can only reveal calcified disease in the heart arteries, it is still possible to have dark plaque coronary disease that cannot be seen on the scan.  It cannot tell us if the disease is on the inside or outside of the vessel, only whether or not it is there (like a pregnancy test, it is either positive or negative). I use it as a tool to decide if statin therapy is indicated (such as Lipitor or Crestor) to help with primary prevention of heart attack.  Statin therapy provides anti-inflammatory therapy to stabilize plaque in the vessels reducing the risk of plaque  rupture heart attack (or stroke) but in doing so often leads to further calcification of the heart arteries and therefore I do not repeat the scan as it can look worse after statin therapy.  If you have other indications to be on statin therapy then I generally do not recommend the test.  It is a personal decision.  It is an out of pocket expense of approximately $100-200 and can be ordered at any time.  If done at [facility], we can review the images together. Let me know if you think this test is right for you.  Again, I typically only order it if it will /therapy, again such as starting statin therapy. I typically do not order the test in people over 70.        It is my pleasure to participate in the care of Mr. Mason.  Please do not hesitate to contact me with questions or concerns.    Sondra Mac MD, [facility]  Cardiologist, [facility]    Please note that this dictation was created using voice recognition software. I have made every reasonable attempt to correct obvious errors, but it is possible there are errors of grammar and possibly content that I did not discover before finalizing the note.

## 2025-01-23 ENCOUNTER — OFFICE VISIT (OUTPATIENT)
Dept: CARDIOLOGY | Facility: MEDICAL CENTER | Age: 30
End: 2025-01-23
Payer: COMMERCIAL

## 2025-01-23 VITALS
HEART RATE: 105 BPM | BODY MASS INDEX: 23.7 KG/M2 | DIASTOLIC BLOOD PRESSURE: 66 MMHG | HEIGHT: 67 IN | WEIGHT: 151 LBS | SYSTOLIC BLOOD PRESSURE: 100 MMHG | OXYGEN SATURATION: 95 % | RESPIRATION RATE: 14 BRPM

## 2025-01-23 DIAGNOSIS — E78.00 PURE HYPERCHOLESTEROLEMIA: ICD-10-CM

## 2025-01-23 DIAGNOSIS — I70.0 ATHEROSCLEROSIS OF AORTA (HCC): ICD-10-CM

## 2025-01-23 DIAGNOSIS — F15.10 CAFFEINE ABUSE (HCC): ICD-10-CM

## 2025-01-23 DIAGNOSIS — I45.6 WPW (WOLFF-PARKINSON-WHITE SYNDROME): ICD-10-CM

## 2025-01-23 DIAGNOSIS — R00.2 PALPITATIONS: ICD-10-CM

## 2025-01-23 DIAGNOSIS — F12.10 CANNABIS ABUSE: ICD-10-CM

## 2025-01-23 PROCEDURE — 99211 OFF/OP EST MAY X REQ PHY/QHP: CPT | Performed by: INTERNAL MEDICINE

## 2025-01-23 PROCEDURE — 3078F DIAST BP <80 MM HG: CPT | Performed by: INTERNAL MEDICINE

## 2025-01-23 PROCEDURE — 3074F SYST BP LT 130 MM HG: CPT | Performed by: INTERNAL MEDICINE

## 2025-01-23 PROCEDURE — 99204 OFFICE O/P NEW MOD 45 MIN: CPT | Performed by: INTERNAL MEDICINE

## 2025-01-23 RX ORDER — FLECAINIDE ACETATE 50 MG/1
50 TABLET ORAL SEE ADMIN INSTRUCTIONS
Qty: 20 TABLET | Refills: 0 | Status: SHIPPED | OUTPATIENT
Start: 2025-01-23

## 2025-01-23 ASSESSMENT — FIBROSIS 4 INDEX: FIB4 SCORE: 0.51

## 2025-01-23 NOTE — PATIENT INSTRUCTIONS
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  This can be completely cured with ablation which is the procedure of choice.  I am referring you to a specialist cardiologist known as an electrophysiologist cardiologist to get this done.  My team will make an appointment with Dr. Gerardo Florence.    -I am giving you 10 tablets of a medicine called flecainide 50 mg.  If you go back into this electrical situation again, you can pop the tablet in your mouth and it should break it.    -Typically I start statin therapy when people have elevated cholesterol and/or her smoking but I wait till the age of 40.  I do not think we need to start cholesterol in your 30s for atherosclerosis of the aorta.  -I typically start statins if you have an elevated lipoprotein a or an abnormal calcium score or strong family history, you have none of these.  -However, if you choose to go on statin therapy, that is fine and can be done with your PCP.    Primary prevention of heart attack and stroke:    Most people over 70 have some degree of calcified heart disease that is not clinically relevant. Unfortunately, placing a stent over heart disease that is not clinically relevant does not reduce mortality.  We cannot bypass blockages that are not 70% or more because the bypass grafts will not mature and will be lost.  Blockages in the heart artery should only have a stent or bypass if they are more than 70% blocked and causing symptoms (at which point most people have symptoms such as chest pain or unusual shortness of breath with activity that goes away with rest).  People having a sudden heart attack should have a stent placed in the blocked artery.  People having chest pain that limits their ability to function could consider having a stent placed in the artery.  Otherwise, medication and lifestyle changes are the preferred treatments and are very powerful.    Lifestyle  and medications are typically more important than stents or bypass outside of a medical emergency.    The only things to do for calcified heart disease to lower your risk of sudden heart attack (or stroke which is the same disease but a different location- in the brain):  -Keep LDL cholesterol under 100 (or under 70 if you have already had a heart attack/stroke or documented vascular disease).  -If appropriate, take a statin medication (as a vascular medication, not just a cholesterol medication) which is our most powerful weapon to stabilize the plaques and reduce inflammation making them less likely to rupture open and cause a sudden heart attack/stroke.  -Control blood pressure, under 130/80, ideally closer to 120/80.  -Control blood sugar, don't get diabetes.  -Don't smoke.  -Eat a heart healthy diet that reduces inflammation: Pritikin, Mediterranean, Vegetarian, Vegan  -Get regular exercise: 150 minutes of moderate exercise OR 75 minutes of very vigorous exercise every week.  -Keep your body mass index under 30, ideal BMI is 20-25.  -Find ricco in life, manage stress, develop close/meaningful personal relationships (proven to help people live longer)  -Know the signs and symptoms of heart attack and stroke and when to call 9-1-1.    Signs of a heart attack: Anything very intense coming from the chest that lasts more than 15 minutes, consider calling 911.  -Pain or pressure in the chest that is intense, lasts more than 15 minutes, not explained by other known reasons such as known/similar heartburn  -It can feel like severe heart burn but associated with nausea, vomiting  -It can be vague pain that radiates to the neck, jaw, shoulders, arm/arms, wrap around your back or even cause a toothache  -Can be associated with unusual shortness of breath at rest or sense of impending doom  *If you think you are having a heart attack, chew up 4 baby aspirin (81 mg) and call 911.    Signs of a stroke: sudden inability to  "talk, smile on one side, confusion, inability to move arm/leg on one side, numbness/tingling of arm/leg on one side that is not typical. \"Think FAST.\"  F=face drooping  A=arm weakness  S=Speech difficulty  T= time to call 911 (do not give ride to someone, call ambulance to alert the hospital to start stroke protocol)    Please look into the following food lifestyles:  Mediterranean diet.  Pritikin - used at [facility], probably one of the best for anti-inflammatory  Vegan/Vegetarian diet.  DASH diet.    Resources to learn more:  -American Heart Association  -Www.Cardiosmart.org, sponsored by the American College of cardiology.    Regarding statins:  -There is a lot of misinformation about statins.  -It is incredibly rare to have a debilitating reaction affecting the muscles.  -Some people have achy muscles without damage (this occurs in 5 to 6% of the population).  -If statin therapy is right for you and you cannot tolerate 1 statin, we have many different statins to try.  -Sometimes we find a reversible cause of statin intolerance such as vitamin D deficiency or co-Q10 deficiency.  -Keep working with your provider until you find a dose and brand of statin therapy that you can take without having any side effects.  -Side effects can also include abnormal liver testing which does not lead to permanent liver damage and sometimes we need to keep patients on statins and tolerate mildly abnormal liver function testing.  -For some people, blood sugars increase slightly on statins.  1 in 500 people with pre-diabetes become diabetic 6 months faster.  -Statins do not cause Alzheimer's  -If someone makes it to 75 without a reason to be on a statin for primary prevention then it is not necessary to start statin therapy.    Statin holiday:  Your cholesterol medication can cause muscle aches/sore joints but sometimes it can be difficult to know if it is happening.  Stop your statin therapy for 1 week, note if " you feel better, resume the therapy, note if you feel worse again.  Report back to me if you think your statin is making you feel worse.      Calcium Scores-AKA the tie breaker for statins-your calcium score is 0 so I am not recommending statins yet:    A calcium score is a low radiation dose CT scan to look for calcification of the heart arteries.  It is a screening tool to help assess cardiovascular risk.  It can only reveal calcified disease in the heart arteries, it is still possible to have dark plaque coronary disease that cannot be seen on the scan.  It cannot tell us if the disease is on the inside or outside of the vessel, only whether or not it is there (like a pregnancy test, it is either positive or negative). I use it as a tool to decide if statin therapy is indicated (such as Lipitor or Crestor) to help with primary prevention of heart attack.  Statin therapy provides anti-inflammatory therapy to stabilize plaque in the vessels reducing the risk of plaque rupture heart attack (or stroke) but in doing so often leads to further calcification of the heart arteries and therefore I do not repeat the scan as it can look worse after statin therapy.  If you have other indications to be on statin therapy then I generally do not recommend the test.  It is a personal decision.  It is an out of pocket expense of approximately $100-200 and can be ordered at any time.  If done at [facility], we can review the images together. Let me know if you think this test is right for you.  Again, I typically only order it if it will /therapy, again such as starting statin therapy. I typically do not order the test in people over 70.

## 2025-01-23 NOTE — LETTER
[facility] Oper[facility] by [facility]   [address]   [address]  [place] [address]  Phone: [phone]  Fax: [phone]              Logan Mason  1995    Encounter Date: 1/23/2025    Sondra Mac M.D.          PROGRESS NOTE:        Medical decision making:  -ECG is classic for WPW with preexcitation.  His symptoms are completely consistent with an episode of most likely A-fib with RVR.  -Referring him to EP for ablation which is highly recommended.    -Pill in pocket flecainide.  -Echocardiogram pending.  -I have asked him to reduce his caffeine intake significantly.  -I have asked him not to inhale anything as it drives inflammation.  -Lipoprotein a is normal and calcium score is normal so I do not think he needs to start a statin for atherosclerosis in the aorta until he is the age of 39, unless he continues to smoke or develops a family history.  This can be managed with his PCP.  -I will follow-up on his echocardiogram and he can return to general cardiology as needed when he is finished with EP.    Problem list:  1. WPW (Giancarlo-Parkinson-White syndrome)  - EC-ECHOCARDIOGRAM COMPLETE W/O CONT; Future  - REFERRAL TO CARDIOLOGY  - flecainide (TAMBOCOR) 50 MG tablet; Take 1 Tablet by mouth see administration instructions. 1 to 2 tablets every 12 hours as needed for palpitations  Dispense: 20 Tablet; Refill: 0    2. Caffeine abuse (HCC)    3. Cannabis abuse    4. Pure hypercholesterolemia    5. Atherosclerosis of aorta (HCC)    6. Palpitations  - EC-ECHOCARDIOGRAM COMPLETE W/O CONT; Future     Chief Complaint:   Chief Complaint   Patient presents with   • New Patient     Per referral:  - Atherosclerosis of aorta (HCC)  - Atherosclerosis of common carotid artery       History of Present Illness:  Logan Mason is a 29 y.o. male who is referred by MISTY Manuel for atherosclerosis of the aorta and abnormal  ECG, caffeine overuse, hyperlipidemia.    Was at work, underneath a car, came in from the car, stood up, very lightheaded, dizzy, nausea, short of breath, heart racing with chest pressure, had to sit down, felt like he was going to pass out.  Seem to last 30 to 45 minutes.  Urgent care sent him to the ED, had a stroke evaluation.  Was discharge from the hospital without much information, back to [employer] which is part of her [employer], told about atherosclerosis and refer to cardiology.  This has not happened before or since.    Admitted to the emergency room October 2020 for for some dizziness, stroke rule out.  It was commented he had plaque accumulation on his aorta and bilateral carotid arteries.  I personally did not see plaque in the aorta.  Calcium score was 0.    Caffeine overuse, sounds like easily 10 to 12 units daily.    Recreation cannabis, inhaling.    Hyperlipidemia, .  HDL and triglycerides normal.  Normal Lipoprotein A.    No prior hypertension.  No family history of premature coronary artery disease.  Mother has hypertension.  No prior smoking history.  No history of diabetes.  No history of autoimmune disease such as lupus or rheumatoid arthritis.  No history of chest radiation or cardiotoxic chemotherapy.  No chronic kidney disease.  No ETOH overuse.  No hx asthma.  Covid, recovered 100%.    Otherwise when he is feeling well, no issues.  Not limited by chest pain, pressure or tightness with activity.  No significant dyspnea on exertion, orthopnea or lower extremity swelling.  No significant palpitations, lightheadedness, or presyncope/syncope.  No symptoms of leg claudication.  No stroke/TIA like symptoms.    Lives in [place].   to Martha Kramer who is here today with their son.     Wt Readings from Last 5 Encounters:   01/23/25 68.5 kg (151 lb)   12/17/24 70.3 kg (155 lb)   11/30/24 70.5 kg (155 lb 8 oz)   11/29/24 72.6 kg (160 lb)   10/23/24 71.5 kg (157 lb 10.1 oz)  "      DATA REVIEWED by me:  ECG (my personal interpretation)  10/23/2024 sinus, 61, intermittent preexcitation  10/23/2024 sinus, 63, preexcitation    Echo  Pending    CAC  10/1/2025  Coronary calcification:  LMA - 0.0  LCX - 0.0  LAD - 0.0  RCA - 0.0  Total Calcium Score: 0.0    MRI brain  10/23/2024  Unremarkable    CTA head and neck 10/23/2024  CT angiogram within normal limits  Atherosclerotic plaque in the left internal carotid artery and right internal carotid artery and plaque in the aorta      Most recent labs:       Lab Results   Component Value Date/Time    WBC 7.9 10/23/2024 01:09 PM    HEMOGLOBIN 16.6 10/23/2024 01:09 PM    HEMATOCRIT 47.5 10/23/2024 01:09 PM    MCV 87.2 10/23/2024 01:09 PM      Lab Results   Component Value Date/Time    SODIUM 137 10/23/2024 01:09 PM    POTASSIUM 3.8 10/23/2024 01:09 PM    CHLORIDE 103 10/23/2024 01:09 PM    CO2 22 10/23/2024 01:09 PM    GLUCOSE 91 10/23/2024 01:09 PM    BUN 13 10/23/2024 01:09 PM    CREATININE 0.79 10/23/2024 01:09 PM      Lab Results   Component Value Date/Time    ASTSGOT 27 10/23/2024 01:09 PM    ALTSGPT 40 10/23/2024 01:09 PM    ALBUMIN 4.9 10/23/2024 01:09 PM      Lab Results   Component Value Date/Time    CHOLSTRLTOT 191 01/10/2025 09:06 AM     (H) 01/10/2025 09:06 AM    HDL 61 01/10/2025 09:06 AM    TRIGLYCERIDE 33 01/10/2025 09:06 AM     No results for input(s): \"NTPROBNP\", \"TROPONINT\" in the last 72 hours.      History reviewed. No pertinent past medical history.  History reviewed. No pertinent surgical history.  Family History   Problem Relation Age of Onset   • Hypertension Mother      Social History     Socioeconomic History   • Marital status:      Spouse name: Martha   • Number of children: 0   • Years of education: Not on file   • Highest education level: 12th grade   Occupational History   • Not on file   Tobacco Use   • Smoking status: Never     Passive exposure: Never   • Smokeless tobacco: Never   Vaping Use   • Vaping " status: Every Day   • Substances: THC   • Devices: Pre-filled or refillable cartridge   Substance and Sexual Activity   • Alcohol use: Yes     Alcohol/week: 6.0 oz     Types: 10 Cans of beer per week     Comment: rarely   • Drug use: Yes     Frequency: 7.0 times per week     Types: Marijuana, Inhaled     Comment: daily since age 2018   • Sexual activity: Yes     Partners: Female     Birth control/protection: None   Other Topics Concern   • Not on file   Social History Narrative   • Not on file     Social Drivers of Health     Financial Resource Strain: Medium Risk (2/21/2024)    Overall Financial Resource Strain (CARDIA)    • Difficulty of Paying Living Expenses: Somewhat hard   Food Insecurity: Food Insecurity Present (2/21/2024)    Hunger Vital Sign    • Worried About Running Out of Food in the Last Year: Sometimes true    • Ran Out of Food in the Last Year: Never true   Transportation Needs: No Transportation Needs (2/21/2024)    PRAPARE - Transportation    • Lack of Transportation (Medical): No    • Lack of Transportation (Non-Medical): No   Physical Activity: Sufficiently Active (2/21/2024)    Exercise Vital Sign    • Days of Exercise per Week: 4 days    • Minutes of Exercise per Session: 60 min   Stress: No Stress Concern Present (2/21/2024)    [place] [employer] - Occupational Stress Questionnaire    • Feeling of Stress : Not at all   Social Connections: Socially Isolated (2/21/2024)    Social Connection and Isolation Panel [NHANES]    • Frequency of Communication with Friends and Family: Once a week    • Frequency of Social Gatherings with Friends and Family: Never    • Attends Congregational Services: Never    • Active Member of Clubs or Organizations: No    • Attends Club or Organization Meetings: Never    • Marital Status:    Intimate Partner Violence: Not on file   Housing Stability: High Risk (2/21/2024)    Housing Stability Vital Sign    • Unable to Pay for Housing in the Last  "Year: Yes    • Number of Places Lived in the Last Year: 1    • Unstable Housing in the Last Year: No     No Known Allergies    Current Outpatient Medications   Medication Sig Dispense Refill   • flecainide (TAMBOCOR) 50 MG tablet Take 1 Tablet by mouth see administration instructions. 1 to 2 tablets every 12 hours as needed for palpitations 20 Tablet 0     No current facility-administered medications for this visit.       ROS  All others systems reviewed and negative.    /66 (BP Location: Left arm, Patient Position: Sitting, BP Cuff Size: Adult)   Pulse (!) 105   Resp 14   Ht 1.702 m (5' 7\")   Wt 68.5 kg (151 lb)   SpO2 95%  Body mass index is 23.65 kg/m².    General: No acute distress. Well nourished.  HEENT: EOM grossly intact, no scleral icterus, no pharyngeal erythema.   Neck:  No JVD, no bruits, trachea midline  CVS: RRR. Normal S1, S2. No M/R/G. No LE edema.  2+ radial pulses, 2+ PT pulses  Resp: CTAB. No wheezing or crackles/rhonchi. Normal respiratory effort.  Abdomen: Soft, NT, no marquez hepatomegaly.  MSK/Ext: No clubbing or cyanosis.  Skin: Warm and dry, no rashes.  Neurological: CN III-XII grossly intact. No focal deficits.   Psych: A&O x 3, appropriate affect, good judgement      Return if symptoms worsen or fail to improve.    Written instructions given today:      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  This can be completely cured with ablation which is the procedure of choice.  I am referring you to a specialist cardiologist known as an electrophysiologist cardiologist to get this done.  My team will make an appointment with Dr. Gerardo Florence.    -I am giving you 10 tablets of a medicine called flecainide 50 mg.  If you go back into this electrical situation again, you can pop the tablet in your mouth and it should break it.    -Typically I start statin therapy when people have elevated cholesterol and/or her " smoking but I wait till the age of 40.  I do not think we need to start cholesterol in your 30s for atherosclerosis of the aorta.  -I typically start statins if you have an elevated lipoprotein a or an abnormal calcium score or strong family history, you have none of these.  -However, if you choose to go on statin therapy, that is fine and can be done with your PCP.    Primary prevention of heart attack and stroke:    Most people over 70 have some degree of calcified heart disease that is not clinically relevant. Unfortunately, placing a stent over heart disease that is not clinically relevant does not reduce mortality.  We cannot bypass blockages that are not 70% or more because the bypass grafts will not mature and will be lost.  Blockages in the heart artery should only have a stent or bypass if they are more than 70% blocked and causing symptoms (at which point most people have symptoms such as chest pain or unusual shortness of breath with activity that goes away with rest).  People having a sudden heart attack should have a stent placed in the blocked artery.  People having chest pain that limits their ability to function could consider having a stent placed in the artery.  Otherwise, medication and lifestyle changes are the preferred treatments and are very powerful.    Lifestyle and medications are typically more important than stents or bypass outside of a medical emergency.    The only things to do for calcified heart disease to lower your risk of sudden heart attack (or stroke which is the same disease but a different location- in the brain):  -Keep LDL cholesterol under 100 (or under 70 if you have already had a heart attack/stroke or documented vascular disease).  -If appropriate, take a statin medication (as a vascular medication, not just a cholesterol medication) which is our most powerful weapon to stabilize the plaques and reduce inflammation making them less likely to rupture open and cause a  "sudden heart attack/stroke.  -Control blood pressure, under 130/80, ideally closer to 120/80.  -Control blood sugar, don't get diabetes.  -Don't smoke.  -Eat a heart healthy diet that reduces inflammation: Pritikin, Mediterranean, Vegetarian, Vegan  -Get regular exercise: 150 minutes of moderate exercise OR 75 minutes of very vigorous exercise every week.  -Keep your body mass index under 30, ideal BMI is 20-25.  -Find ricco in life, manage stress, develop close/meaningful personal relationships (proven to help people live longer)  -Know the signs and symptoms of heart attack and stroke and when to call 9-1-1.    Signs of a heart attack: Anything very intense coming from the chest that lasts more than 15 minutes, consider calling 911.  -Pain or pressure in the chest that is intense, lasts more than 15 minutes, not explained by other known reasons such as known/similar heartburn  -It can feel like severe heart burn but associated with nausea, vomiting  -It can be vague pain that radiates to the neck, jaw, shoulders, arm/arms, wrap around your back or even cause a toothache  -Can be associated with unusual shortness of breath at rest or sense of impending doom  *If you think you are having a heart attack, chew up 4 baby aspirin (81 mg) and call 911.    Signs of a stroke: sudden inability to talk, smile on one side, confusion, inability to move arm/leg on one side, numbness/tingling of arm/leg on one side that is not typical. \"Think FAST.\"  F=face drooping  A=arm weakness  S=Speech difficulty  T= time to call 911 (do not give ride to someone, call ambulance to alert the hospital to start stroke protocol)    Please look into the following food lifestyles:  Mediterranean diet.  Pritikin - used at [facility], probably one of the best for anti-inflammatory  Vegan/Vegetarian diet.  DASH diet.    Resources to learn more:  -American Heart Association  -Www.Cardiosmart.org, sponsored by the American College " of cardiology.    Regarding statins:  -There is a lot of misinformation about statins.  -It is incredibly rare to have a debilitating reaction affecting the muscles.  -Some people have achy muscles without damage (this occurs in 5 to 6% of the population).  -If statin therapy is right for you and you cannot tolerate 1 statin, we have many different statins to try.  -Sometimes we find a reversible cause of statin intolerance such as vitamin D deficiency or co-Q10 deficiency.  -Keep working with your provider until you find a dose and brand of statin therapy that you can take without having any side effects.  -Side effects can also include abnormal liver testing which does not lead to permanent liver damage and sometimes we need to keep patients on statins and tolerate mildly abnormal liver function testing.  -For some people, blood sugars increase slightly on statins.  1 in 500 people with pre-diabetes become diabetic 6 months faster.  -Statins do not cause Alzheimer's  -If someone makes it to 75 without a reason to be on a statin for primary prevention then it is not necessary to start statin therapy.    Statin holiday:  Your cholesterol medication can cause muscle aches/sore joints but sometimes it can be difficult to know if it is happening.  Stop your statin therapy for 1 week, note if you feel better, resume the therapy, note if you feel worse again.  Report back to me if you think your statin is making you feel worse.      Calcium Scores-AKA the tie breaker for statins-your calcium score is 0 so I am not recommending statins yet::    A calcium score is a low radiation dose CT scan to look for calcification of the heart arteries.  It is a screening tool to help assess cardiovascular risk.  It can only reveal calcified disease in the heart arteries, it is still possible to have dark plaque coronary disease that cannot be seen on the scan.  It cannot tell us if the disease is on the inside or outside of the  vessel, only whether or not it is there (like a pregnancy test, it is either positive or negative). I use it as a tool to decide if statin therapy is indicated (such as Lipitor or Crestor) to help with primary prevention of heart attack.  Statin therapy provides anti-inflammatory therapy to stabilize plaque in the vessels reducing the risk of plaque rupture heart attack (or stroke) but in doing so often leads to further calcification of the heart arteries and therefore I do not repeat the scan as it can look worse after statin therapy.  If you have other indications to be on statin therapy then I generally do not recommend the test.  It is a personal decision.  It is an out of pocket expense of approximately $100-200 and can be ordered at any time.  If done at [facility], we can review the images together. Let me know if you think this test is right for you.  Again, I typically only order it if it will /therapy, again such as starting statin therapy. I typically do not order the test in people over 70.        It is my pleasure to participate in the care of Mr. Mason.  Please do not hesitate to contact me with questions or concerns.    Sondra Mac MD, [facility]  Cardiologist, [facility]    Please note that this dictation was created using voice recognition software. I have made every reasonable attempt to correct obvious errors, but it is possible there are errors of grammar and possibly content that I did not discover before finalizing the note.      Jody Pickens, [email or web address].  [address] [facility] [address]  [place] [address]  Via In Basket

## 2025-03-07 ENCOUNTER — ANCILLARY PROCEDURE (OUTPATIENT)
Dept: CARDIOLOGY | Facility: MEDICAL CENTER | Age: 30
End: 2025-03-07
Attending: INTERNAL MEDICINE
Payer: COMMERCIAL

## 2025-03-07 ENCOUNTER — OFFICE VISIT (OUTPATIENT)
Dept: CARDIOLOGY | Facility: MEDICAL CENTER | Age: 30
End: 2025-03-07
Attending: STUDENT IN AN ORGANIZED HEALTH CARE EDUCATION/TRAINING PROGRAM
Payer: COMMERCIAL

## 2025-03-07 VITALS
HEIGHT: 67 IN | RESPIRATION RATE: 16 BRPM | OXYGEN SATURATION: 95 % | HEART RATE: 78 BPM | DIASTOLIC BLOOD PRESSURE: 52 MMHG | BODY MASS INDEX: 25.9 KG/M2 | WEIGHT: 165 LBS | SYSTOLIC BLOOD PRESSURE: 100 MMHG

## 2025-03-07 DIAGNOSIS — R00.2 PALPITATIONS: ICD-10-CM

## 2025-03-07 DIAGNOSIS — I45.6 WPW (WOLFF-PARKINSON-WHITE SYNDROME): ICD-10-CM

## 2025-03-07 DIAGNOSIS — I45.6 WOLFF-PARKINSON-WHITE (WPW) PATTERN: ICD-10-CM

## 2025-03-07 PROCEDURE — 99211 OFF/OP EST MAY X REQ PHY/QHP: CPT | Performed by: STUDENT IN AN ORGANIZED HEALTH CARE EDUCATION/TRAINING PROGRAM

## 2025-03-07 PROCEDURE — 99204 OFFICE O/P NEW MOD 45 MIN: CPT | Performed by: STUDENT IN AN ORGANIZED HEALTH CARE EDUCATION/TRAINING PROGRAM

## 2025-03-07 PROCEDURE — 93306 TTE W/DOPPLER COMPLETE: CPT

## 2025-03-07 ASSESSMENT — FIBROSIS 4 INDEX: FIB4 SCORE: 0.51

## 2025-03-08 NOTE — PROGRESS NOTES
Arrhythmia Clinic Note (New EP patient)    DOS: 3/7/2025     Chief complaint/Reason for consult: WPW with preexcitation     Referring provider: Dr. Sondra Mac     Interval History:  Mr.Daniel Mason is a 29 years old gentleman with medical history of chronic back pain, marijuana use, recent ECG with preexcitation came for more EP elevation.  Patient felt dizziness, palpitations, nausea, near syncope in October/2024 and went to the ER with unremarkable cardiac and neurological workup.  Patient reports intermittent palpitations for several minutes few times a week without chest pain, syncope.  He vapes marijuana.  He drinks 1-2 beers daily.  He denies family history of WPW syndrome and sudden cardiac death at a young age.  He was noticed to have preexcitation prominent over V4-V6 on ECGs.  He follows cardiologist Dr. Mca. He was started flecainide 50 mg as needed for palpitations.  He has not taken flecainide at this point.    He is accompanied by his wife today.     ROS (+ highlighted in red):  General--Negative for fatigue, weight loss or weight gain  Cardiovascular--intermittent palpitations, negative for CP, orthopnea, PND, syncope    No past medical history on file.    No past surgical history on file.    Social History     Socioeconomic History    Marital status:      Spouse name: Martha    Number of children: 0    Years of education: Not on file    Highest education level: 12th grade   Occupational History    Not on file   Tobacco Use    Smoking status: Never     Passive exposure: Never    Smokeless tobacco: Never   Vaping Use    Vaping status: Every Day    Substances: THC    Devices: Pre-filled or refillable cartridge   Substance and Sexual Activity    Alcohol use: Yes     Alcohol/week: 6.0 oz     Types: 10 Cans of beer per week     Comment: daily    Drug use: Yes     Frequency: 7.0 times per week     Types: Marijuana, Inhaled     Comment: daily since age 2018    Sexual activity: Yes      Partners: Female     Birth control/protection: None   Other Topics Concern    Not on file   Social History Narrative    Not on file     Social Drivers of Health     Financial Resource Strain: Medium Risk (2/21/2024)    Overall Financial Resource Strain (CARDIA)     Difficulty of Paying Living Expenses: Somewhat hard   Food Insecurity: Food Insecurity Present (2/21/2024)    Hunger Vital Sign     Worried About Running Out of Food in the Last Year: Sometimes true     Ran Out of Food in the Last Year: Never true   Transportation Needs: No Transportation Needs (2/21/2024)    PRAPARE - Transportation     Lack of Transportation (Medical): No     Lack of Transportation (Non-Medical): No   Physical Activity: Sufficiently Active (2/21/2024)    Exercise Vital Sign     Days of Exercise per Week: 4 days     Minutes of Exercise per Session: 60 min   Stress: No Stress Concern Present (2/21/2024)    [place] [employer] - Occupational Stress Questionnaire     Feeling of Stress : Not at all   Social Connections: Socially Isolated (2/21/2024)    Social Connection and Isolation Panel [NHANES]     Frequency of Communication with Friends and Family: Once a week     Frequency of Social Gatherings with Friends and Family: Never     Attends Hinduism Services: Never     Active Member of Clubs or Organizations: No     Attends Club or Organization Meetings: Never     Marital Status:    Intimate Partner Violence: Not on file   Housing Stability: High Risk (2/21/2024)    Housing Stability Vital Sign     Unable to Pay for Housing in the Last Year: Yes     Number of Places Lived in the Last Year: 1     Unstable Housing in the Last Year: No       Family History   Problem Relation Age of Onset    Hypertension Mother        No Known Allergies    Current Outpatient Medications   Medication Sig Dispense Refill    flecainide (TAMBOCOR) 50 MG tablet Take 1 Tablet by mouth see administration instructions. 1 to 2 tablets every  "12 hours as needed for palpitations 20 Tablet 0     No current facility-administered medications for this visit.       Physical Exam:  Vitals:    03/07/25 1614   BP: 100/52   BP Location: Left arm   Patient Position: Sitting   BP Cuff Size: Adult   Pulse: 78   Resp: 16   SpO2: 95%   Weight: 74.8 kg (165 lb)   Height: 1.702 m (5' 7\")     General appearance: NAD, conversant  HEENT: PERRL, neck is supple with FROM  Lungs: Clear to auscultation, normal respiratory effort  CV: RRR, no murmurs/rubs/gallops, no JVD  Abdomen: Soft, non-tender with normal bowel sounds  Extremities: No peripheral edema, no clubbing or cyanosis  Skin: No rash, lesions, or ulcers  Psych: Alert and oriented to person, place and time    Data:    Lab Results   Component Value Date/Time    CHOLSTRLTOT 191 01/10/2025 09:06 AM     (H) 01/10/2025 09:06 AM    HDL 61 01/10/2025 09:06 AM    TRIGLYCERIDE 33 01/10/2025 09:06 AM       Lab Results   Component Value Date/Time    SODIUM 137 10/23/2024 01:09 PM    POTASSIUM 3.8 10/23/2024 01:09 PM    CHLORIDE 103 10/23/2024 01:09 PM    CO2 22 10/23/2024 01:09 PM    GLUCOSE 91 10/23/2024 01:09 PM    BUN 13 10/23/2024 01:09 PM    CREATININE 0.79 10/23/2024 01:09 PM     Lab Results   Component Value Date/Time    ALKPHOSPHAT 120 (H) 10/23/2024 01:09 PM    ASTSGOT 27 10/23/2024 01:09 PM    ALTSGPT 40 10/23/2024 01:09 PM    TBILIRUBIN 0.9 10/23/2024 01:09 PM        EKG interpreted by me:  SR, preexcitation prominent over V4-V6    Impression/Plan:  1. Giancarlo-Parkinson-White (WPW) pattern  NM-CARDIAC TREADMILL ONLY-NO IMAGING    Cardiac Event Monitor      2. Palpitations  NM-CARDIAC TREADMILL ONLY-NO IMAGING    Cardiac Event Monitor      3. History of marijuana use    - I discussed with Mr.Daniel Mason the etiologies of WPW, the requirement of further sudden death assessment of accessory pathway.  - I recommend treadmill stress test and event monitor first and further potential EPS and ablation.  - I will see " him after tests above    Gerardo Florence MD, PhD  Cardiac Electrophysiologist

## 2025-03-10 ENCOUNTER — RESULTS FOLLOW-UP (OUTPATIENT)
Dept: CARDIOLOGY | Facility: MEDICAL CENTER | Age: 30
End: 2025-03-10

## 2025-03-10 LAB
LV EJECT FRACT  99904: 55
LV EJECT FRACT MOD 2C 99903: 47.65
LV EJECT FRACT MOD 4C 99902: 59.29
LV EJECT FRACT MOD BP 99901: 46.19

## 2025-03-10 PROCEDURE — 93306 TTE W/DOPPLER COMPLETE: CPT | Mod: 26 | Performed by: INTERNAL MEDICINE

## 2025-03-14 ENCOUNTER — NON-PROVIDER VISIT (OUTPATIENT)
Dept: CARDIOLOGY | Facility: MEDICAL CENTER | Age: 30
End: 2025-03-14
Attending: STUDENT IN AN ORGANIZED HEALTH CARE EDUCATION/TRAINING PROGRAM
Payer: COMMERCIAL

## 2025-03-14 DIAGNOSIS — I45.6 WOLFF-PARKINSON-WHITE (WPW) PATTERN: ICD-10-CM

## 2025-03-14 DIAGNOSIS — R00.2 PALPITATIONS: ICD-10-CM

## 2025-03-14 PROCEDURE — 93246 EXT ECG>7D<15D RECORDING: CPT

## 2025-03-14 NOTE — PROGRESS NOTES
Patient enrolled in the 14 day o Holter monitoring program per Gerardo Florence MD.  >Office hook-up, serial # [id number].  >Currently pending EOS.

## 2025-04-02 ENCOUNTER — TELEPHONE (OUTPATIENT)
Dept: CARDIOLOGY | Facility: MEDICAL CENTER | Age: 30
End: 2025-04-02
Payer: COMMERCIAL

## 2025-04-02 ENCOUNTER — RESULTS FOLLOW-UP (OUTPATIENT)
Dept: CARDIOLOGY | Facility: MEDICAL CENTER | Age: 30
End: 2025-04-02
Payer: COMMERCIAL

## 2025-04-02 NOTE — TELEPHONE ENCOUNTER
HERLINDA EOS to 's nurse, Skylar, on 4/2/2025    Preliminary findings:    1 episode of -146 with an avg rate of 130 bpm    Sinus Rhythm  with an avg rate of 85 bpm    Rare Supraventricular and Ventricular ectopics; no noted triplets    9 patient events:  SR       To Patient- These findings are preliminary and HAVE NOT yet been reviewed by your provider. Please allow our team time to review these findings and someone from our office will contact you if any follow up is necessary.

## 2025-04-04 ENCOUNTER — HOSPITAL ENCOUNTER (OUTPATIENT)
Dept: RADIOLOGY | Facility: MEDICAL CENTER | Age: 30
End: 2025-04-04
Attending: STUDENT IN AN ORGANIZED HEALTH CARE EDUCATION/TRAINING PROGRAM
Payer: COMMERCIAL

## 2025-04-04 DIAGNOSIS — I45.6 WOLFF-PARKINSON-WHITE (WPW) PATTERN: ICD-10-CM

## 2025-04-04 DIAGNOSIS — R00.2 PALPITATIONS: ICD-10-CM

## 2025-04-04 PROCEDURE — 93018 CV STRESS TEST I&R ONLY: CPT | Performed by: INTERNAL MEDICINE

## 2025-04-04 PROCEDURE — 93017 CV STRESS TEST TRACING ONLY: CPT | Mod: TC

## 2025-04-08 ENCOUNTER — RESULTS FOLLOW-UP (OUTPATIENT)
Dept: CARDIOLOGY | Facility: MEDICAL CENTER | Age: 30
End: 2025-04-08
Payer: COMMERCIAL

## 2025-04-25 ENCOUNTER — OFFICE VISIT (OUTPATIENT)
Dept: CARDIOLOGY | Facility: MEDICAL CENTER | Age: 30
End: 2025-04-25
Attending: STUDENT IN AN ORGANIZED HEALTH CARE EDUCATION/TRAINING PROGRAM
Payer: COMMERCIAL

## 2025-04-25 VITALS
RESPIRATION RATE: 18 BRPM | BODY MASS INDEX: 24.96 KG/M2 | DIASTOLIC BLOOD PRESSURE: 74 MMHG | WEIGHT: 159 LBS | HEIGHT: 67 IN | OXYGEN SATURATION: 95 % | SYSTOLIC BLOOD PRESSURE: 110 MMHG | HEART RATE: 96 BPM

## 2025-04-25 DIAGNOSIS — I45.6 WOLFF-PARKINSON-WHITE (WPW) PATTERN: ICD-10-CM

## 2025-04-25 DIAGNOSIS — R00.2 PALPITATIONS: ICD-10-CM

## 2025-04-25 PROCEDURE — 3074F SYST BP LT 130 MM HG: CPT | Performed by: STUDENT IN AN ORGANIZED HEALTH CARE EDUCATION/TRAINING PROGRAM

## 2025-04-25 PROCEDURE — 99214 OFFICE O/P EST MOD 30 MIN: CPT | Performed by: STUDENT IN AN ORGANIZED HEALTH CARE EDUCATION/TRAINING PROGRAM

## 2025-04-25 PROCEDURE — 3078F DIAST BP <80 MM HG: CPT | Performed by: STUDENT IN AN ORGANIZED HEALTH CARE EDUCATION/TRAINING PROGRAM

## 2025-04-25 ASSESSMENT — FIBROSIS 4 INDEX: FIB4 SCORE: 0.51

## 2025-04-25 NOTE — PROGRESS NOTES
Arrhythmia Clinic Note (Established EP patient)    DOS: 4/25/2025       Interval History:  Mr.Daniel Mason is a 29 years old gentleman with medical history of chronic back pain, marijuana use, recent ECG with preexcitation came for more EP elevation.  Patient felt dizziness, palpitations, nausea, near syncope in October/2024 and went to the ER with unremarkable cardiac and neurological workup.  Patient reports intermittent palpitations for several minutes few times a week without chest pain, syncope.  He vapes marijuana.  He drinks 1-2 beers daily.  He denies family history of WPW syndrome and sudden cardiac death at a young age.  He was noticed to have preexcitation prominent over V4-V6 on ECGs.  He follows cardiologist Dr. Mac. He was started flecainide 50 mg as needed for palpitations.  He has not taken flecainide at this point.    His event monitor showed minimal delta wave and treadmill stress with less delta wave on peaked heart rate.    He is accompanied by his wife today.     ROS (+ highlighted in red):  General--Negative for fatigue, weight loss or weight gain  Cardiovascular--intermittent palpitations, negative for CP, orthopnea, PND, syncope    No past medical history on file.    No past surgical history on file.    Social History     Socioeconomic History    Marital status:      Spouse name: Martha    Number of children: 0    Years of education: Not on file    Highest education level: 12th grade   Occupational History    Not on file   Tobacco Use    Smoking status: Never     Passive exposure: Never    Smokeless tobacco: Never   Vaping Use    Vaping status: Every Day    Substances: THC    Devices: Pre-filled or refillable cartridge   Substance and Sexual Activity    Alcohol use: Yes     Alcohol/week: 6.0 oz     Types: 10 Cans of beer per week     Comment: 1-2 daily    Drug use: Yes     Frequency: 7.0 times per week     Types: Marijuana, Inhaled     Comment: daily since age [age]    Sexual  activity: Yes     Partners: Female     Birth control/protection: None   Other Topics Concern    Not on file   Social History Narrative    Not on file     Social Drivers of Health     Financial Resource Strain: Medium Risk (2/21/2024)    Overall Financial Resource Strain (CARDIA)     Difficulty of Paying Living Expenses: Somewhat hard   Food Insecurity: Food Insecurity Present (2/21/2024)    Hunger Vital Sign     Worried About Running Out of Food in the Last Year: Sometimes true     Ran Out of Food in the Last Year: Never true   Transportation Needs: No Transportation Needs (2/21/2024)    PRAPARE - Transportation     Lack of Transportation (Medical): No     Lack of Transportation (Non-Medical): No   Physical Activity: Sufficiently Active (2/21/2024)    Exercise Vital Sign     Days of Exercise per Week: 4 days     Minutes of Exercise per Session: 60 min   Stress: No Stress Concern Present (2/21/2024)    Citizen of the [place] of Occupational Health - Occupational Stress Questionnaire     Feeling of Stress : Not at all   Social Connections: Socially Isolated (2/21/2024)    Social Connection and Isolation Panel [NHANES]     Frequency of Communication with Friends and Family: Once a week     Frequency of Social Gatherings with Friends and Family: Never     Attends Church Services: Never     Active Member of Clubs or Organizations: No     Attends Club or Organization Meetings: Never     Marital Status:    Intimate Partner Violence: Not on file   Housing Stability: High Risk (2/21/2024)    Housing Stability Vital Sign     Unable to Pay for Housing in the Last Year: Yes     Number of Places Lived in the Last Year: 1     Unstable Housing in the Last Year: No       Family History   Problem Relation Age of Onset    Hypertension Mother        No Known Allergies    Current Outpatient Medications   Medication Sig Dispense Refill    flecainide (TAMBOCOR) 50 MG tablet Take 1 Tablet by mouth see administration instructions. 1  "to 2 tablets every 12 hours as needed for palpitations (Patient not taking: Reported on 4/25/2025) 20 Tablet 0     No current facility-administered medications for this visit.       Physical Exam:  Vitals:    04/25/25 1419   BP: 110/74   BP Location: Right arm   Patient Position: Sitting   BP Cuff Size: Adult   Pulse: 96   Resp: 18   SpO2: 95%   Weight: 72.1 kg (159 lb)   Height: 1.702 m (5' 7\")     General appearance: NAD, conversant  HEENT: PERRL, neck is supple with FROM  Lungs: Clear to auscultation, normal respiratory effort  CV: RRR, no murmurs/rubs/gallops, no JVD  Abdomen: Soft, non-tender with normal bowel sounds  Extremities: No peripheral edema, no clubbing or cyanosis  Skin: No rash, lesions, or ulcers  Psych: Alert and oriented to person, place and time    Data:    Lab Results   Component Value Date/Time    CHOLSTRLTOT 191 01/10/2025 09:06 AM     (H) 01/10/2025 09:06 AM    HDL 61 01/10/2025 09:06 AM    TRIGLYCERIDE 33 01/10/2025 09:06 AM       Lab Results   Component Value Date/Time    SODIUM 137 10/23/2024 01:09 PM    POTASSIUM 3.8 10/23/2024 01:09 PM    CHLORIDE 103 10/23/2024 01:09 PM    CO2 22 10/23/2024 01:09 PM    GLUCOSE 91 10/23/2024 01:09 PM    BUN 13 10/23/2024 01:09 PM    CREATININE 0.79 10/23/2024 01:09 PM     Lab Results   Component Value Date/Time    ALKPHOSPHAT 120 (H) 10/23/2024 01:09 PM    ASTSGOT 27 10/23/2024 01:09 PM    ALTSGPT 40 10/23/2024 01:09 PM    TBILIRUBIN 0.9 10/23/2024 01:09 PM        EKG interpreted by me:  SR, preexcitation prominent over V4-V6          Impression/Plan:  1. Palpitations  Cardiac Event Monitor      2. Giancarlo-Parkinson-White (WPW) pattern  Cardiac Event Monitor      3. History of marijuana use    - I discussed with Mr.Daniel Mason the etiologies of WPW, the requirement of further sudden death assessment of accessory pathway, His event monitor showed minimal delta wave and sudden disappearance of delta wave on tachycardia, treadmill stress with less " delta wave on peaked heart rate, less likely he has high risk of accessory pathway. I encourage him to have healthy lifestyle modifications, cutting down alcohol use, cutting down marijuana use, regular exercise  - I will see him in one year after event monitor    Gerardo Florence MD, PhD  Cardiac Electrophysiologist

## 2025-04-28 ENCOUNTER — TELEPHONE (OUTPATIENT)
Dept: HEALTH INFORMATION MANAGEMENT | Facility: OTHER | Age: 30
End: 2025-04-28
Payer: COMMERCIAL